# Patient Record
Sex: MALE | Race: WHITE | NOT HISPANIC OR LATINO | URBAN - METROPOLITAN AREA
[De-identification: names, ages, dates, MRNs, and addresses within clinical notes are randomized per-mention and may not be internally consistent; named-entity substitution may affect disease eponyms.]

---

## 2017-07-13 ENCOUNTER — EMERGENCY (EMERGENCY)
Facility: HOSPITAL | Age: 82
LOS: 1 days | Discharge: HOME | End: 2017-07-13
Attending: EMERGENCY MEDICINE | Admitting: EMERGENCY MEDICINE
Payer: MEDICARE

## 2017-07-13 VITALS
DIASTOLIC BLOOD PRESSURE: 95 MMHG | RESPIRATION RATE: 18 BRPM | OXYGEN SATURATION: 100 % | HEART RATE: 64 BPM | TEMPERATURE: 99 F | SYSTOLIC BLOOD PRESSURE: 118 MMHG

## 2017-07-13 VITALS
SYSTOLIC BLOOD PRESSURE: 102 MMHG | TEMPERATURE: 98 F | DIASTOLIC BLOOD PRESSURE: 54 MMHG | OXYGEN SATURATION: 100 % | RESPIRATION RATE: 18 BRPM | HEART RATE: 67 BPM

## 2017-07-13 LAB
ALBUMIN SERPL ELPH-MCNC: 3 G/DL — LOW (ref 3.3–5)
ALP SERPL-CCNC: 75 U/L — SIGNIFICANT CHANGE UP (ref 40–120)
ALT FLD-CCNC: 21 U/L — SIGNIFICANT CHANGE UP (ref 4–41)
ANISOCYTOSIS BLD QL: SLIGHT — SIGNIFICANT CHANGE UP
APPEARANCE UR: CLEAR — SIGNIFICANT CHANGE UP
APTT BLD: 27.7 SEC — SIGNIFICANT CHANGE UP (ref 27.5–37.4)
AST SERPL-CCNC: 20 U/L — SIGNIFICANT CHANGE UP (ref 4–40)
B PERT DNA SPEC QL NAA+PROBE: SIGNIFICANT CHANGE UP
BACTERIA # UR AUTO: SIGNIFICANT CHANGE UP
BASE EXCESS BLDV CALC-SCNC: 2.6 MMOL/L — SIGNIFICANT CHANGE UP
BASOPHILS # BLD AUTO: 0.03 K/UL — SIGNIFICANT CHANGE UP (ref 0–0.2)
BASOPHILS NFR BLD AUTO: 0.3 % — SIGNIFICANT CHANGE UP (ref 0–2)
BASOPHILS NFR SPEC: 0 % — SIGNIFICANT CHANGE UP (ref 0–2)
BILIRUB SERPL-MCNC: 0.5 MG/DL — SIGNIFICANT CHANGE UP (ref 0.2–1.2)
BILIRUB UR-MCNC: NEGATIVE — SIGNIFICANT CHANGE UP
BLOOD GAS VENOUS - CREATININE: 0.88 MG/DL — SIGNIFICANT CHANGE UP (ref 0.5–1.3)
BLOOD UR QL VISUAL: NEGATIVE — SIGNIFICANT CHANGE UP
BUN SERPL-MCNC: 21 MG/DL — SIGNIFICANT CHANGE UP (ref 7–23)
C PNEUM DNA SPEC QL NAA+PROBE: NOT DETECTED — SIGNIFICANT CHANGE UP
CALCIUM SERPL-MCNC: 8 MG/DL — LOW (ref 8.4–10.5)
CHLORIDE BLDV-SCNC: 106 MMOL/L — SIGNIFICANT CHANGE UP (ref 96–108)
CHLORIDE SERPL-SCNC: 104 MMOL/L — SIGNIFICANT CHANGE UP (ref 98–107)
CK MB BLD-MCNC: 1 NG/ML — SIGNIFICANT CHANGE UP (ref 1–6.6)
CK SERPL-CCNC: 41 U/L — SIGNIFICANT CHANGE UP (ref 30–200)
CO2 SERPL-SCNC: 24 MMOL/L — SIGNIFICANT CHANGE UP (ref 22–31)
COLOR SPEC: YELLOW — SIGNIFICANT CHANGE UP
CREAT SERPL-MCNC: 0.89 MG/DL — SIGNIFICANT CHANGE UP (ref 0.5–1.3)
EOSINOPHIL # BLD AUTO: 0.1 K/UL — SIGNIFICANT CHANGE UP (ref 0–0.5)
EOSINOPHIL NFR BLD AUTO: 1 % — SIGNIFICANT CHANGE UP (ref 0–6)
EOSINOPHIL NFR FLD: 2 % — SIGNIFICANT CHANGE UP (ref 0–6)
FLUAV H1 2009 PAND RNA SPEC QL NAA+PROBE: NOT DETECTED — SIGNIFICANT CHANGE UP
FLUAV H1 RNA SPEC QL NAA+PROBE: NOT DETECTED — SIGNIFICANT CHANGE UP
FLUAV H3 RNA SPEC QL NAA+PROBE: NOT DETECTED — SIGNIFICANT CHANGE UP
FLUAV SUBTYP SPEC NAA+PROBE: SIGNIFICANT CHANGE UP
FLUBV RNA SPEC QL NAA+PROBE: NOT DETECTED — SIGNIFICANT CHANGE UP
GAS PNL BLDV: 129 MMOL/L — LOW (ref 136–146)
GLUCOSE BLDV-MCNC: 118 — HIGH (ref 70–99)
GLUCOSE SERPL-MCNC: 119 MG/DL — HIGH (ref 70–99)
GLUCOSE UR-MCNC: NEGATIVE — SIGNIFICANT CHANGE UP
HADV DNA SPEC QL NAA+PROBE: NOT DETECTED — SIGNIFICANT CHANGE UP
HCO3 BLDV-SCNC: 26 MMOL/L — SIGNIFICANT CHANGE UP (ref 20–27)
HCOV 229E RNA SPEC QL NAA+PROBE: NOT DETECTED — SIGNIFICANT CHANGE UP
HCOV HKU1 RNA SPEC QL NAA+PROBE: NOT DETECTED — SIGNIFICANT CHANGE UP
HCOV NL63 RNA SPEC QL NAA+PROBE: NOT DETECTED — SIGNIFICANT CHANGE UP
HCOV OC43 RNA SPEC QL NAA+PROBE: NOT DETECTED — SIGNIFICANT CHANGE UP
HCT VFR BLD CALC: 33.4 % — LOW (ref 39–50)
HCT VFR BLDV CALC: 34.5 % — LOW (ref 39–51)
HGB BLD-MCNC: 10.7 G/DL — LOW (ref 13–17)
HGB BLDV-MCNC: 11.2 G/DL — LOW (ref 13–17)
HMPV RNA SPEC QL NAA+PROBE: NOT DETECTED — SIGNIFICANT CHANGE UP
HPIV1 RNA SPEC QL NAA+PROBE: NOT DETECTED — SIGNIFICANT CHANGE UP
HPIV2 RNA SPEC QL NAA+PROBE: NOT DETECTED — SIGNIFICANT CHANGE UP
HPIV3 RNA SPEC QL NAA+PROBE: NOT DETECTED — SIGNIFICANT CHANGE UP
HPIV4 RNA SPEC QL NAA+PROBE: NOT DETECTED — SIGNIFICANT CHANGE UP
HYPOCHROMIA BLD QL: SLIGHT — SIGNIFICANT CHANGE UP
IMM GRANULOCYTES # BLD AUTO: 0.05 # — SIGNIFICANT CHANGE UP
IMM GRANULOCYTES NFR BLD AUTO: 0.5 % — SIGNIFICANT CHANGE UP (ref 0–1.5)
INR BLD: 1.23 — HIGH (ref 0.88–1.17)
KETONES UR-MCNC: NEGATIVE — SIGNIFICANT CHANGE UP
LACTATE BLDV-MCNC: 1.4 MMOL/L — SIGNIFICANT CHANGE UP (ref 0.5–2)
LEUKOCYTE ESTERASE UR-ACNC: NEGATIVE — SIGNIFICANT CHANGE UP
LYMPHOCYTES # BLD AUTO: 1.34 K/UL — SIGNIFICANT CHANGE UP (ref 1–3.3)
LYMPHOCYTES # BLD AUTO: 13.5 % — SIGNIFICANT CHANGE UP (ref 13–44)
LYMPHOCYTES NFR SPEC AUTO: 14 % — SIGNIFICANT CHANGE UP (ref 13–44)
M PNEUMO DNA SPEC QL NAA+PROBE: NOT DETECTED — SIGNIFICANT CHANGE UP
MANUAL SMEAR VERIFICATION: SIGNIFICANT CHANGE UP
MCHC RBC-ENTMCNC: 29.8 PG — SIGNIFICANT CHANGE UP (ref 27–34)
MCHC RBC-ENTMCNC: 32 % — SIGNIFICANT CHANGE UP (ref 32–36)
MCV RBC AUTO: 93 FL — SIGNIFICANT CHANGE UP (ref 80–100)
MONOCYTES # BLD AUTO: 0.81 K/UL — SIGNIFICANT CHANGE UP (ref 0–0.9)
MONOCYTES NFR BLD AUTO: 8.2 % — SIGNIFICANT CHANGE UP (ref 2–14)
MONOCYTES NFR BLD: 6 % — SIGNIFICANT CHANGE UP (ref 2–9)
MUCOUS THREADS # UR AUTO: SIGNIFICANT CHANGE UP
MYELOCYTES NFR BLD: 1 % — HIGH (ref 0–0)
NEUTROPHIL AB SER-ACNC: 77 % — SIGNIFICANT CHANGE UP (ref 43–77)
NEUTROPHILS # BLD AUTO: 7.57 K/UL — HIGH (ref 1.8–7.4)
NEUTROPHILS NFR BLD AUTO: 76.5 % — SIGNIFICANT CHANGE UP (ref 43–77)
NITRITE UR-MCNC: NEGATIVE — SIGNIFICANT CHANGE UP
NRBC # FLD: 0 — SIGNIFICANT CHANGE UP
PCO2 BLDV: 48 MMHG — SIGNIFICANT CHANGE UP (ref 41–51)
PH BLDV: 7.37 PH — SIGNIFICANT CHANGE UP (ref 7.32–7.43)
PH UR: 6.5 — SIGNIFICANT CHANGE UP (ref 4.6–8)
PLATELET # BLD AUTO: 96 K/UL — LOW (ref 150–400)
PLATELET COUNT - ESTIMATE: SIGNIFICANT CHANGE UP
PMV BLD: 10.2 FL — SIGNIFICANT CHANGE UP (ref 7–13)
PO2 BLDV: 47 MMHG — HIGH (ref 35–40)
POTASSIUM BLDV-SCNC: 3.7 MMOL/L — SIGNIFICANT CHANGE UP (ref 3.4–4.5)
POTASSIUM SERPL-MCNC: 4.1 MMOL/L — SIGNIFICANT CHANGE UP (ref 3.5–5.3)
POTASSIUM SERPL-SCNC: 4.1 MMOL/L — SIGNIFICANT CHANGE UP (ref 3.5–5.3)
PROT SERPL-MCNC: 5.6 G/DL — LOW (ref 6–8.3)
PROT UR-MCNC: 30 — SIGNIFICANT CHANGE UP
PROTHROM AB SERPL-ACNC: 13.8 SEC — HIGH (ref 9.8–13.1)
RBC # BLD: 3.59 M/UL — LOW (ref 4.2–5.8)
RBC # FLD: 13.9 % — SIGNIFICANT CHANGE UP (ref 10.3–14.5)
RBC CASTS # UR COMP ASSIST: SIGNIFICANT CHANGE UP (ref 0–?)
RSV RNA SPEC QL NAA+PROBE: NOT DETECTED — SIGNIFICANT CHANGE UP
RV+EV RNA SPEC QL NAA+PROBE: NOT DETECTED — SIGNIFICANT CHANGE UP
SAO2 % BLDV: 81 % — SIGNIFICANT CHANGE UP (ref 60–85)
SODIUM SERPL-SCNC: 138 MMOL/L — SIGNIFICANT CHANGE UP (ref 135–145)
SP GR SPEC: 1.02 — SIGNIFICANT CHANGE UP (ref 1–1.03)
TROPONIN T SERPL-MCNC: < 0.06 NG/ML — SIGNIFICANT CHANGE UP (ref 0–0.06)
UROBILINOGEN FLD QL: 1 E.U. — SIGNIFICANT CHANGE UP (ref 0.1–0.2)
WBC # BLD: 9.9 K/UL — SIGNIFICANT CHANGE UP (ref 3.8–10.5)
WBC # FLD AUTO: 9.9 K/UL — SIGNIFICANT CHANGE UP (ref 3.8–10.5)
WBC UR QL: SIGNIFICANT CHANGE UP (ref 0–?)

## 2017-07-13 PROCEDURE — 71010: CPT | Mod: 26

## 2017-07-13 PROCEDURE — 93010 ELECTROCARDIOGRAM REPORT: CPT

## 2017-07-13 PROCEDURE — 70450 CT HEAD/BRAIN W/O DYE: CPT | Mod: 26

## 2017-07-13 PROCEDURE — 99285 EMERGENCY DEPT VISIT HI MDM: CPT | Mod: 25,GC

## 2017-07-13 NOTE — ED ADULT NURSE NOTE - CHIEF COMPLAINT QUOTE
pt BIBEMS with a c/o AMS , As per patients family , pt was found unresponsive to command sluggish and aphasic. 0930 he was unable to speak. Dr Camargo  called for a stroke evaluation, code not called. AS per pt Hypotensive in the field , pt given a half of bolus.   pmhx Dementia HTN

## 2017-07-13 NOTE — ED PROVIDER NOTE - OBJECTIVE STATEMENT
80 YOM PMH DM, HTN, HLD, dementia, p/w generalized weakness and altered mental status for 3 days. Pt visited a respite facility Monday and since then suffered from altered mental status above baseline today. Pt AOx0 today unable to provide additional history.  Per daughter and wife Pt was supposed to go to respite facility today and was less responsive then usual which prompted the family to bring him to the ED. 80 YOM PMH DM, HTN, HLD, dementia, p/w generalized weakness and altered mental status for 3 days. Pt visited a respite facility Monday and since then suffered from altered mental status above baseline today. Pt AOx0 today unable to provide additional history.  Per daughter and wife Pt was supposed to go to respite facility today and was less responsive then usual which prompted the family to bring him to the ED.    Attending/Harris: 81 yo M w/ remote h/o colon CA ~ 8 years ago s/p resection, dementia USOH unitl this morning fter eating breakfast and taking a shower, was found by daughter sitting in chair and was not arousable until EMS arrival. As per EMS ePCR pt was arousable to verbal stimuli and at baseline of A&Ox2. 80 YOM PMH DM, HTN, HLD, dementia, p/w generalized weakness and altered mental status for 3 days. Pt visited a respite facility Monday and since then suffered from altered mental status above baseline today. Pt AOx0 today unable to provide additional history.  Per daughter and wife Pt was supposed to go to respite facility today and was less responsive then usual which prompted the family to bring him to the ED.    Attending/Harris: 81 yo M w/ remote h/o colon CA ~ 8 years ago s/p resection, dementia USOH until this morning after eating breakfast and taking a shower, was found by daughter sitting in chair and was not arousable until EMS arrival. As per EMS ePCR pt was arousable to verbal stimuli and at baseline of A&Ox2.  As per his daughter, during the event, her father was breathing and able to squeeze her hand.

## 2017-07-13 NOTE — ED PROVIDER NOTE - MEDICAL DECISION MAKING DETAILS
80 YOM PMH DM, HTN, HLD, dementia, p/w generalized weakness and altered mental status for 3 days.  VSS WNL.  Exam reveals AMS above baseline.  Evaluate and treat for AMS.  Treat symptomatically.  Admit.

## 2017-07-13 NOTE — ED PROVIDER NOTE - PROGRESS NOTE DETAILS
JW Pt back to baseline.  CT and labs now unremarkable. Pt has remained at baseline while in the ED. Test results reviewed with family who requested d/c to home. PMD office is closed until next week.

## 2017-07-13 NOTE — ED PROVIDER NOTE - DISCUSSED CLINICAL AND RADIOLOGICAL FINDINGS WITH, MDM
Pt states she has nausea. Denies unconsciousness.  Hit on back of the head by wooden susan in school today patient

## 2017-07-13 NOTE — ED ADULT NURSE NOTE - OBJECTIVE STATEMENT
Received patient to room 13, Patient is here for AMS. Pt evaluated by MD. He is alert and oriented times three. IVL placed to left AC 18 gauge by EMS. Labs drawn and sent. Awwaiting results and disposition.     LAUREN Amaral

## 2017-07-13 NOTE — ED PROVIDER NOTE - PHYSICAL EXAMINATION
Attending/Harris: NAD; PERRL/EOMI, non-icterus, supple, no NATIVIDAD, no JVD, RRR, CTAB; Abd-soft, NT/ND, no HSM; Rectal Temp-98.4; -uncircumcise, no testicular swelling or PT; no LE edema, A&Ox1, nonfocal; Skin-warm/dry

## 2017-07-13 NOTE — ED ADULT TRIAGE NOTE - CHIEF COMPLAINT QUOTE
pt BIBEMS with a c/o AMS , As per patients family , pt was found unresponsive to command sluggish and aphasic. 0930 he was unable to speak. Dr Camargo  called for a stroke evaluation. AS per pt Hypotensive in the field , pt given a half of bolus.   pmhx Dementia HTN pt BIBEMS with a c/o AMS , As per patients family , pt was found unresponsive to command sluggish and aphasic. 0930 he was unable to speak. Dr Camargo  called for a stroke evaluation, code not called. AS per pt Hypotensive in the field , pt given a half of bolus.   pmhx Dementia HTN

## 2017-07-14 ENCOUNTER — INPATIENT (INPATIENT)
Facility: HOSPITAL | Age: 82
LOS: 5 days | Discharge: HOME | End: 2017-07-20
Attending: INTERNAL MEDICINE | Admitting: INTERNAL MEDICINE
Payer: MEDICARE

## 2017-07-14 VITALS
DIASTOLIC BLOOD PRESSURE: 60 MMHG | TEMPERATURE: 102 F | HEIGHT: 68 IN | SYSTOLIC BLOOD PRESSURE: 116 MMHG | WEIGHT: 134.48 LBS | OXYGEN SATURATION: 96 % | RESPIRATION RATE: 16 BRPM | HEART RATE: 78 BPM

## 2017-07-14 DIAGNOSIS — F10.10 ALCOHOL ABUSE, UNCOMPLICATED: ICD-10-CM

## 2017-07-14 DIAGNOSIS — G30.1 ALZHEIMER'S DISEASE WITH LATE ONSET: ICD-10-CM

## 2017-07-14 DIAGNOSIS — I26.99 OTHER PULMONARY EMBOLISM WITHOUT ACUTE COR PULMONALE: ICD-10-CM

## 2017-07-14 DIAGNOSIS — J15.6 PNEUMONIA DUE TO OTHER GRAM-NEGATIVE BACTERIA: ICD-10-CM

## 2017-07-14 DIAGNOSIS — E78.00 PURE HYPERCHOLESTEROLEMIA, UNSPECIFIED: ICD-10-CM

## 2017-07-14 DIAGNOSIS — C18.9 MALIGNANT NEOPLASM OF COLON, UNSPECIFIED: ICD-10-CM

## 2017-07-14 LAB
ALBUMIN SERPL ELPH-MCNC: 2.5 G/DL — LOW (ref 3.3–5)
ALP SERPL-CCNC: 88 U/L — SIGNIFICANT CHANGE UP (ref 40–120)
ALT FLD-CCNC: 31 U/L — SIGNIFICANT CHANGE UP (ref 12–78)
ANION GAP SERPL CALC-SCNC: 6 MMOL/L — SIGNIFICANT CHANGE UP (ref 5–17)
ANION GAP SERPL CALC-SCNC: 8 MMOL/L — SIGNIFICANT CHANGE UP (ref 5–17)
APPEARANCE UR: CLEAR — SIGNIFICANT CHANGE UP
APTT BLD: 29.2 SEC — SIGNIFICANT CHANGE UP (ref 27.5–37.4)
APTT BLD: 63.6 SEC — HIGH (ref 27.5–37.4)
AST SERPL-CCNC: 58 U/L — HIGH (ref 15–37)
BASE EXCESS BLDA CALC-SCNC: 1.2 MMOL/L — SIGNIFICANT CHANGE UP (ref -2–2)
BASOPHILS # BLD AUTO: 0.1 K/UL — SIGNIFICANT CHANGE UP (ref 0–0.2)
BASOPHILS NFR BLD AUTO: 0.5 % — SIGNIFICANT CHANGE UP (ref 0–2)
BILIRUB SERPL-MCNC: 1 MG/DL — SIGNIFICANT CHANGE UP (ref 0.2–1.2)
BILIRUB UR-MCNC: NEGATIVE — SIGNIFICANT CHANGE UP
BLOOD GAS COMMENTS: SIGNIFICANT CHANGE UP
BLOOD GAS SOURCE: SIGNIFICANT CHANGE UP
BUN SERPL-MCNC: 14 MG/DL — SIGNIFICANT CHANGE UP (ref 7–23)
BUN SERPL-MCNC: 19 MG/DL — SIGNIFICANT CHANGE UP (ref 7–23)
CALCIUM SERPL-MCNC: 8.1 MG/DL — LOW (ref 8.5–10.1)
CALCIUM SERPL-MCNC: 8.5 MG/DL — SIGNIFICANT CHANGE UP (ref 8.5–10.1)
CHLORIDE SERPL-SCNC: 103 MMOL/L — SIGNIFICANT CHANGE UP (ref 96–108)
CHLORIDE SERPL-SCNC: 107 MMOL/L — SIGNIFICANT CHANGE UP (ref 96–108)
CO2 SERPL-SCNC: 25 MMOL/L — SIGNIFICANT CHANGE UP (ref 22–31)
CO2 SERPL-SCNC: 27 MMOL/L — SIGNIFICANT CHANGE UP (ref 22–31)
COLOR SPEC: YELLOW — SIGNIFICANT CHANGE UP
CREAT SERPL-MCNC: 0.94 MG/DL — SIGNIFICANT CHANGE UP (ref 0.5–1.3)
CREAT SERPL-MCNC: 1.1 MG/DL — SIGNIFICANT CHANGE UP (ref 0.5–1.3)
DIFF PNL FLD: ABNORMAL
EOSINOPHIL # BLD AUTO: 0 K/UL — SIGNIFICANT CHANGE UP (ref 0–0.5)
EOSINOPHIL NFR BLD AUTO: 0 % — SIGNIFICANT CHANGE UP (ref 0–6)
EPI CELLS # UR: SIGNIFICANT CHANGE UP
GLUCOSE SERPL-MCNC: 129 MG/DL — HIGH (ref 70–99)
GLUCOSE SERPL-MCNC: 153 MG/DL — HIGH (ref 70–99)
GLUCOSE UR QL: NEGATIVE MG/DL — SIGNIFICANT CHANGE UP
HCO3 BLDA-SCNC: 26 MMOL/L — SIGNIFICANT CHANGE UP (ref 21–29)
HCT VFR BLD CALC: 33.4 % — LOW (ref 39–50)
HCT VFR BLD CALC: 34.6 % — LOW (ref 39–50)
HGB BLD-MCNC: 11.8 G/DL — LOW (ref 13–17)
HGB BLD-MCNC: 12 G/DL — LOW (ref 13–17)
HOROWITZ INDEX BLDA+IHG-RTO: 28 — SIGNIFICANT CHANGE UP
INR BLD: 1.23 RATIO — HIGH (ref 0.88–1.16)
KETONES UR-MCNC: ABNORMAL
LACTATE SERPL-SCNC: 1.1 MMOL/L — SIGNIFICANT CHANGE UP (ref 0.7–2)
LACTATE SERPL-SCNC: 2.8 MMOL/L — HIGH (ref 0.7–2)
LEUKOCYTE ESTERASE UR-ACNC: ABNORMAL
LYMPHOCYTES # BLD AUTO: 0.8 K/UL — LOW (ref 1–3.3)
LYMPHOCYTES # BLD AUTO: 7.1 % — LOW (ref 13–44)
MCHC RBC-ENTMCNC: 30.6 PG — SIGNIFICANT CHANGE UP (ref 27–34)
MCHC RBC-ENTMCNC: 31.8 PG — SIGNIFICANT CHANGE UP (ref 27–34)
MCHC RBC-ENTMCNC: 34.6 GM/DL — SIGNIFICANT CHANGE UP (ref 32–36)
MCHC RBC-ENTMCNC: 35.2 GM/DL — SIGNIFICANT CHANGE UP (ref 32–36)
MCV RBC AUTO: 88.6 FL — SIGNIFICANT CHANGE UP (ref 80–100)
MCV RBC AUTO: 90.3 FL — SIGNIFICANT CHANGE UP (ref 80–100)
MONOCYTES # BLD AUTO: 0.7 K/UL — SIGNIFICANT CHANGE UP (ref 0–0.9)
MONOCYTES NFR BLD AUTO: 6.2 % — SIGNIFICANT CHANGE UP (ref 2–14)
NEUTROPHILS # BLD AUTO: 9.5 K/UL — HIGH (ref 1.8–7.4)
NEUTROPHILS NFR BLD AUTO: 86.2 % — HIGH (ref 43–77)
NITRITE UR-MCNC: NEGATIVE — SIGNIFICANT CHANGE UP
PCO2 BLDA: 42 MMHG — SIGNIFICANT CHANGE UP (ref 32–46)
PH BLD: 7.4 — SIGNIFICANT CHANGE UP (ref 7.35–7.45)
PH UR: 6 — SIGNIFICANT CHANGE UP (ref 5–8)
PLATELET # BLD AUTO: 100 K/UL — LOW (ref 150–400)
PLATELET # BLD AUTO: 102 K/UL — LOW (ref 150–400)
PO2 BLDA: 86 MMHG — SIGNIFICANT CHANGE UP (ref 74–108)
POTASSIUM SERPL-MCNC: 3.7 MMOL/L — SIGNIFICANT CHANGE UP (ref 3.5–5.3)
POTASSIUM SERPL-MCNC: 5.5 MMOL/L — HIGH (ref 3.5–5.3)
POTASSIUM SERPL-SCNC: 3.7 MMOL/L — SIGNIFICANT CHANGE UP (ref 3.5–5.3)
POTASSIUM SERPL-SCNC: 5.5 MMOL/L — HIGH (ref 3.5–5.3)
PROT SERPL-MCNC: 6.7 GM/DL — SIGNIFICANT CHANGE UP (ref 6–8.3)
PROT UR-MCNC: 30 MG/DL
PROTHROM AB SERPL-ACNC: 13.5 SEC — HIGH (ref 9.8–12.7)
RBC # BLD: 3.7 M/UL — LOW (ref 4.2–5.8)
RBC # BLD: 3.9 M/UL — LOW (ref 4.2–5.8)
RBC # FLD: 12.9 % — SIGNIFICANT CHANGE UP (ref 11–15)
RBC # FLD: 13 % — SIGNIFICANT CHANGE UP (ref 11–15)
RBC CASTS # UR COMP ASSIST: ABNORMAL /HPF (ref 0–4)
SAO2 % BLDA: 96 % — SIGNIFICANT CHANGE UP (ref 92–96)
SODIUM SERPL-SCNC: 136 MMOL/L — SIGNIFICANT CHANGE UP (ref 135–145)
SODIUM SERPL-SCNC: 140 MMOL/L — SIGNIFICANT CHANGE UP (ref 135–145)
SP GR SPEC: 1.01 — SIGNIFICANT CHANGE UP (ref 1.01–1.02)
UROBILINOGEN FLD QL: 4 MG/DL
WBC # BLD: 10.1 K/UL — SIGNIFICANT CHANGE UP (ref 3.8–10.5)
WBC # BLD: 11 K/UL — HIGH (ref 3.8–10.5)
WBC # FLD AUTO: 10.1 K/UL — SIGNIFICANT CHANGE UP (ref 3.8–10.5)
WBC # FLD AUTO: 11 K/UL — HIGH (ref 3.8–10.5)
WBC UR QL: SIGNIFICANT CHANGE UP

## 2017-07-14 PROCEDURE — 93010 ELECTROCARDIOGRAM REPORT: CPT

## 2017-07-14 PROCEDURE — 71275 CT ANGIOGRAPHY CHEST: CPT | Mod: 26

## 2017-07-14 PROCEDURE — 99285 EMERGENCY DEPT VISIT HI MDM: CPT

## 2017-07-14 PROCEDURE — 70450 CT HEAD/BRAIN W/O DYE: CPT | Mod: 26

## 2017-07-14 RX ORDER — SERTRALINE 25 MG/1
100 TABLET, FILM COATED ORAL DAILY
Qty: 0 | Refills: 0 | Status: DISCONTINUED | OUTPATIENT
Start: 2017-07-14 | End: 2017-07-20

## 2017-07-14 RX ORDER — QUETIAPINE FUMARATE 200 MG/1
50 TABLET, FILM COATED ORAL AT BEDTIME
Qty: 0 | Refills: 0 | Status: DISCONTINUED | OUTPATIENT
Start: 2017-07-14 | End: 2017-07-20

## 2017-07-14 RX ORDER — PIPERACILLIN AND TAZOBACTAM 4; .5 G/20ML; G/20ML
3.38 INJECTION, POWDER, LYOPHILIZED, FOR SOLUTION INTRAVENOUS ONCE
Qty: 0 | Refills: 0 | Status: COMPLETED | OUTPATIENT
Start: 2017-07-14 | End: 2017-07-14

## 2017-07-14 RX ORDER — VANCOMYCIN HCL 1 G
750 VIAL (EA) INTRAVENOUS EVERY 12 HOURS
Qty: 0 | Refills: 0 | Status: DISCONTINUED | OUTPATIENT
Start: 2017-07-15 | End: 2017-07-19

## 2017-07-14 RX ORDER — HEPARIN SODIUM 5000 [USP'U]/ML
2500 INJECTION INTRAVENOUS; SUBCUTANEOUS EVERY 6 HOURS
Qty: 0 | Refills: 0 | Status: DISCONTINUED | OUTPATIENT
Start: 2017-07-14 | End: 2017-07-15

## 2017-07-14 RX ORDER — PIPERACILLIN AND TAZOBACTAM 4; .5 G/20ML; G/20ML
3.38 INJECTION, POWDER, LYOPHILIZED, FOR SOLUTION INTRAVENOUS EVERY 8 HOURS
Qty: 0 | Refills: 0 | Status: DISCONTINUED | OUTPATIENT
Start: 2017-07-14 | End: 2017-07-19

## 2017-07-14 RX ORDER — HEPARIN SODIUM 5000 [USP'U]/ML
5000 INJECTION INTRAVENOUS; SUBCUTANEOUS EVERY 6 HOURS
Qty: 0 | Refills: 0 | Status: DISCONTINUED | OUTPATIENT
Start: 2017-07-14 | End: 2017-07-15

## 2017-07-14 RX ORDER — MEMANTINE HYDROCHLORIDE 10 MG/1
1 TABLET ORAL
Qty: 0 | Refills: 0 | COMMUNITY

## 2017-07-14 RX ORDER — VANCOMYCIN HCL 1 G
1000 VIAL (EA) INTRAVENOUS ONCE
Qty: 0 | Refills: 0 | Status: COMPLETED | OUTPATIENT
Start: 2017-07-14 | End: 2017-07-14

## 2017-07-14 RX ORDER — SODIUM CHLORIDE 9 MG/ML
3 INJECTION INTRAMUSCULAR; INTRAVENOUS; SUBCUTANEOUS ONCE
Qty: 0 | Refills: 0 | Status: COMPLETED | OUTPATIENT
Start: 2017-07-14 | End: 2017-07-14

## 2017-07-14 RX ORDER — VANCOMYCIN HCL 1 G
VIAL (EA) INTRAVENOUS
Qty: 0 | Refills: 0 | Status: DISCONTINUED | OUTPATIENT
Start: 2017-07-14 | End: 2017-07-19

## 2017-07-14 RX ORDER — VANCOMYCIN HCL 1 G
750 VIAL (EA) INTRAVENOUS ONCE
Qty: 0 | Refills: 0 | Status: COMPLETED | OUTPATIENT
Start: 2017-07-14 | End: 2017-07-14

## 2017-07-14 RX ORDER — SIMVASTATIN 20 MG/1
1 TABLET, FILM COATED ORAL
Qty: 0 | Refills: 0 | COMMUNITY

## 2017-07-14 RX ORDER — HEPARIN SODIUM 5000 [USP'U]/ML
INJECTION INTRAVENOUS; SUBCUTANEOUS
Qty: 25000 | Refills: 0 | Status: DISCONTINUED | OUTPATIENT
Start: 2017-07-14 | End: 2017-07-15

## 2017-07-14 RX ORDER — SERTRALINE 25 MG/1
1 TABLET, FILM COATED ORAL
Qty: 0 | Refills: 0 | COMMUNITY

## 2017-07-14 RX ORDER — HEPARIN SODIUM 5000 [USP'U]/ML
5000 INJECTION INTRAVENOUS; SUBCUTANEOUS ONCE
Qty: 0 | Refills: 0 | Status: COMPLETED | OUTPATIENT
Start: 2017-07-14 | End: 2017-07-14

## 2017-07-14 RX ORDER — SIMVASTATIN 20 MG/1
10 TABLET, FILM COATED ORAL AT BEDTIME
Qty: 0 | Refills: 0 | Status: DISCONTINUED | OUTPATIENT
Start: 2017-07-14 | End: 2017-07-20

## 2017-07-14 RX ORDER — ACETAMINOPHEN 500 MG
650 TABLET ORAL ONCE
Qty: 0 | Refills: 0 | Status: COMPLETED | OUTPATIENT
Start: 2017-07-14 | End: 2017-07-14

## 2017-07-14 RX ORDER — MEMANTINE HYDROCHLORIDE 10 MG/1
10 TABLET ORAL DAILY
Qty: 0 | Refills: 0 | Status: DISCONTINUED | OUTPATIENT
Start: 2017-07-14 | End: 2017-07-20

## 2017-07-14 RX ORDER — SODIUM CHLORIDE 9 MG/ML
1000 INJECTION INTRAMUSCULAR; INTRAVENOUS; SUBCUTANEOUS
Qty: 0 | Refills: 0 | Status: DISCONTINUED | OUTPATIENT
Start: 2017-07-14 | End: 2017-07-20

## 2017-07-14 RX ORDER — SODIUM CHLORIDE 9 MG/ML
1850 INJECTION INTRAMUSCULAR; INTRAVENOUS; SUBCUTANEOUS ONCE
Qty: 0 | Refills: 0 | Status: COMPLETED | OUTPATIENT
Start: 2017-07-14 | End: 2017-07-14

## 2017-07-14 RX ADMIN — SERTRALINE 100 MILLIGRAM(S): 25 TABLET, FILM COATED ORAL at 21:18

## 2017-07-14 RX ADMIN — HEPARIN SODIUM 1100 UNIT(S)/HR: 5000 INJECTION INTRAVENOUS; SUBCUTANEOUS at 14:58

## 2017-07-14 RX ADMIN — PIPERACILLIN AND TAZOBACTAM 200 GRAM(S): 4; .5 INJECTION, POWDER, LYOPHILIZED, FOR SOLUTION INTRAVENOUS at 17:06

## 2017-07-14 RX ADMIN — SODIUM CHLORIDE 75 MILLILITER(S): 9 INJECTION INTRAMUSCULAR; INTRAVENOUS; SUBCUTANEOUS at 15:28

## 2017-07-14 RX ADMIN — MEMANTINE HYDROCHLORIDE 10 MILLIGRAM(S): 10 TABLET ORAL at 21:18

## 2017-07-14 RX ADMIN — PIPERACILLIN AND TAZOBACTAM 200 GRAM(S): 4; .5 INJECTION, POWDER, LYOPHILIZED, FOR SOLUTION INTRAVENOUS at 11:10

## 2017-07-14 RX ADMIN — SODIUM CHLORIDE 3 MILLILITER(S): 9 INJECTION INTRAMUSCULAR; INTRAVENOUS; SUBCUTANEOUS at 10:00

## 2017-07-14 RX ADMIN — Medication 150 MILLIGRAM(S): at 17:40

## 2017-07-14 RX ADMIN — Medication 250 MILLIGRAM(S): at 11:34

## 2017-07-14 RX ADMIN — HEPARIN SODIUM 5000 UNIT(S): 5000 INJECTION INTRAVENOUS; SUBCUTANEOUS at 15:00

## 2017-07-14 RX ADMIN — QUETIAPINE FUMARATE 50 MILLIGRAM(S): 200 TABLET, FILM COATED ORAL at 21:18

## 2017-07-14 RX ADMIN — Medication 650 MILLIGRAM(S): at 11:10

## 2017-07-14 RX ADMIN — SODIUM CHLORIDE 925 MILLILITER(S): 9 INJECTION INTRAMUSCULAR; INTRAVENOUS; SUBCUTANEOUS at 10:00

## 2017-07-14 NOTE — H&P ADULT - ASSESSMENT
83 year old man with dementia admitted with right mid lobar segmental Pulmonary embolism and bibasilar pneumonia.

## 2017-07-14 NOTE — H&P ADULT - NSHPPHYSICALEXAM_GEN_ALL_CORE
PHYSICAL EXAM:    GENERAL: NAD, well-groomed, well-developed, confused and disoriented.  HEAD:  Atraumatic, Normocephalic  EYES: EOMI, PERRLA, conjunctiva and sclera clear  ENMT: No tonsillar erythema, exudates, or enlargement; Moist mucous membranes, Good dentition, No lesions  NECK: Supple, No JVD, Normal thyroid  NERVOUS SYSTEM:  Alert & Oriented X3, Good concentration; Motor Strength 5/5 B/L upper and lower extremities; DTRs 2+ intact and symmetric  CHEST/LUNG: Clear to percussion bilaterally; No rales, rhonchi, wheezing, or rubs  HEART: Regular rate and rhythm; No murmurs, rubs, or gallops  ABDOMEN: Soft, Nontender, Nondistended; Bowel sounds present  EXTREMITIES:  2+ Peripheral Pulses, No clubbing, cyanosis, or edema  LYMPH: No lymphadenopathy noted  SKIN: No rashes or lesions

## 2017-07-14 NOTE — ED ADULT TRIAGE NOTE - CHIEF COMPLAINT QUOTE
Per [facility] staff pt arrived to day program with slurred speech sob, bloody sputum with unsteady gait. Per family pt had same symptoms yesterday and was treated at [facility]. Unknown time symptoms started today. Pt has hx of dementia. Staff at [facility] states pt is usually more alert.

## 2017-07-14 NOTE — H&P ADULT - HISTORY OF PRESENT ILLNESS
83 year old man with history of dementia presenting to the emergency from [facility] for evaluation of syncope. He was said to have suddenly become short of breath and then passed out.  In the emergency room he was found to have both pneumonia and pulmonary embolism. Patient was seen at the [facility] emergency room yesterday for similar symptoms.  Work up was negative.

## 2017-07-14 NOTE — H&P ADULT - NSHPLABSRESULTS_GEN_ALL_CORE
CBC Full  -  ( 14 Jul 2017 10:25 )  WBC Count : 11.0 K/uL  Hemoglobin : 12.0 g/dL  Hematocrit : 34.6 %  Platelet Count - Automated : 102 K/uL  Mean Cell Volume : 88.6 fl  Mean Cell Hemoglobin : 30.6 pg  Mean Cell Hemoglobin Concentration : 34.6 gm/dL  Auto Neutrophil # : 9.5 K/uL  Auto Lymphocyte # : 0.8 K/uL  Auto Monocyte # : 0.7 K/uL  Auto Eosinophil # : 0.0 K/uL  Auto Basophil # : 0.1 K/uL  Auto Neutrophil % : 86.2 %  Auto Lymphocyte % : 7.1 %  Auto Monocyte % : 6.2 %  Auto Eosinophil % : 0.0 %  Auto Basophil % : 0.5 %  14 Jul 2017 10:25    136    |  103    |  19     ----------------------------<  129    5.5     |  25     |  1.10     Ca    8.5        14 Jul 2017 10:25    TPro  6.7    /  Alb  2.5    /  TBili  1.0    /  DBili  x      /  AST  58     /  ALT  31     /  AlkPhos  88     14 Jul 2017 10:25  < from: CT Angio Chest w/ IV Cont (07.14.17 @ 12:44) >      CT ANGIO CHEST (W)AW IC                            PROCEDURE DATE:  07/14/2017            IMPRESSION:     Right middle lobe segmental pulmonary emboli.    Bibasilar pneumonia.    Other incidental findings as above.

## 2017-07-14 NOTE — ED ADULT NURSE NOTE - CHIEF COMPLAINT QUOTE
Per [facility] staff pt arrived to day program with slurred speech sob, bloody sputum with unsteady gait. Per family pt had same symptoms yesterday and was treated at [facility]. Unknown time symptoms started today. Pt has hx of dementia. Staff at [facility] states pt is usually more alert.

## 2017-07-14 NOTE — H&P ADULT - NSHPREVIEWOFSYSTEMS_GEN_ALL_CORE
REVIEW OF SYSTEMS:    CONSTITUTIONAL: No fever, weight loss, or fatigue  EYES: No eye pain, visual disturbances, or discharge  ENMT:  No difficulty hearing, tinnitus, vertigo; No sinus or throat pain  NECK: No pain or stiffness  BREASTS: No pain, masses, or nipple discharge  RESPIRATORY: No cough, wheezing, chills or hemoptysis; No shortness of breath  CARDIOVASCULAR: No chest pain, palpitations, dizziness, or leg swelling  GASTROINTESTINAL: No abdominal or epigastric pain. No nausea, vomiting, or hematemesis; No diarrhea or constipation. No melena or hematochezia.  GENITOURINARY: No dysuria, frequency, hematuria, or incontinence  NEUROLOGICAL: No headaches, memory loss, loss of strength, numbness, or tremors  SKIN: No itching, burning, rashes, or lesions   LYMPH NODES: No enlarged glands  ENDOCRINE: No heat or cold intolerance; No hair loss  MUSCULOSKELETAL: No joint pain or swelling; No muscle, back, or extremity pain  PSYCHIATRIC: No depression, anxiety, mood swings, or difficulty sleeping  HEME/LYMPH: No easy bruising, or bleeding gums  ALLERGY AND IMMUNOLOGIC: No hives or eczema

## 2017-07-14 NOTE — ED PROVIDER NOTE - OBJECTIVE STATEMENT
83 year old male with h/o hypercholesterolemia, alcohol abuse, colon cancer s/p colectomy and dementia presents today sent in from [facility] day program 83 year old male with h/o hypercholesterolemia, alcohol abuse, colon cancer s/p colectomy and dementia presents today sent in from [facility], when pt arrived it was reported that he appeared weak and confused, required assistance to get out of the vehicle, pt was also SOB, in triage pt's pulse ox was found to be low, pt is a poor historian

## 2017-07-14 NOTE — CONSULT NOTE ADULT - SUBJECTIVE AND OBJECTIVE BOX
Patient is a 83y old  Male who presents with a chief complaint of sob and not talking.    HPI: 83 year male with HLD, Obesity, Dementia and Colon Cancer treated only with  Colectomy few years ago. Was noticed with altered mental status yesterday, was taken to [facility] . Had work up for CVA which was negative so was released . Today again was noticed , not talking and complained about SOB. Wife reported  that was complaining of chest pain from time to time.  Family also noticed some cough for few days. No smoker.  CTA chest positive for PE.    PAST MEDICAL & SURGICAL HISTORY:  Alcohol abuse  Colon cancer: s/p colectomy  Dementia: mild  Hypercholesterolemia  History of cataract extraction: bilateral    FAMILY HISTORY:  No pertinent family history in first degree relatives    SOCIAL HISTORY: BMI (kg/m2): 27.3 . non smoker    Allergies  No Known Allergies    MEDICATIONS  (STANDING):  sodium chloride 0.9%. 1000 milliLiter(s) (75 mL/Hr) IV Continuous <Continuous>  heparin  Infusion.  Unit(s)/Hr (11 mL/Hr) IV Continuous <Continuous>  piperacillin/tazobactam IVPB. 3.375 Gram(s) IV Intermittent every 8 hours  vancomycin  IVPB   IV Intermittent     MEDICATIONS  (PRN):  heparin  Injectable 5000 Unit(s) IV Push every 6 hours PRN For aPTT less than 40  heparin  Injectable 2500 Unit(s) IV Push every 6 hours PRN For aPTT between 40 - 57    REVIEW OF SYSTEMS:  not  able to report.    Vital Signs Last 24 Hrs  T(C): 36.7 (2017 18:52), Max: 38.7 (2017 09:37)  T(F): 98.1 (2017 18:52), Max: 101.7 (2017 09:37)  HR: 60 (2017 18:52) (60 - 78)  BP: 122/64 (2017 18:52) (116/60 - 130/65)  BP(mean): --  RR: 15 (2017 18:52) (15 - 23)  SpO2: 98% (2017 18:52) (96% - 100%)    PHYSICAL EXAM:  GEN:        Awake, responsive and comfortable.  HEENT:    Normal.    RESP:      no wheezing.  CVS:         Regular rate and rhythm.   ABD:         Soft, non-tender, non-distended; obese.  :             No costovertebral angle tenderness  SKIN:           Warm and dry.  EXTR:            No clubbing, cyanosis or edema  CNS:              Intact sensory and motor function.  PSYCH:        cooperative, no anxiety or depression    LABS:                        12.0   11.0  )-----------( 102      ( 2017 10:25 )             34.6         136  |  103  |  19  ----------------------------<  129<H>  5.5<H>   |  25  |  1.10    Ca    8.5      2017 10:25    TPro  6.7  /  Alb  2.5<L>  /  TBili  1.0  /  DBili  x   /  AST  58<H>  /  ALT  31  /  AlkPhos  88      PT/INR - ( 2017 10:25 )   PT: 13.5 sec;   INR: 1.23 ratio         PTT - ( 2017 10:25 )  PTT:29.2 sec   @ 13:36  pH: 7.40  pCO2: 42  pO2: 86  SaO2: 96    Urinalysis Basic - ( 2017 11:17 )    Color: Yellow / Appearance: Clear / S.015 / pH: x  Gluc: x / Ketone: Trace  / Bili: Negative / Urobili: 4 mg/dL   Blood: x / Protein: 30 mg/dL / Nitrite: Negative   Leuk Esterase: Trace / RBC: 3-5 /HPF / WBC 3-5   Sq Epi: x / Non Sq Epi: Few / Bacteria: x    EKG: sinus rhythm    RADIOLOGY & ADDITIONAL STUDIES:  < from: CT Angio Chest w/ IV Cont (17 @ 12:44) >    EXAM:  CT ANGIO CHEST (W)AW IC                            PROCEDURE DATE:  2017          INTERPRETATION:    CT Angiogram of the chestwas performed with intravenous contrast.  100ml of Omnipaque 350 was injected intravenously. None was discarded.   Coronal, Saggital and 3D reformats were submitted.    Clinical information: bloody sputum, hypoxia, fever  r/o infiltrate    Comparison: None    FINDINGS:    VESSELS: Segmental pulmonary emboli on the right middle lobe. Evaluation   of bibasilarsegmental branches was limited by motion. The RV 2 LV ratio   less than 1. Pulmonary artery normal in caliber.    INCIDENTAL:    CHEST WALL AND LOWER NECK: Within normal limits  MEDIASTINUM AND MARA: Scattered calcified lymph nodes  HEART: Cardiomegaly, extensive coronary calcifications  LUNG AND LARGE AIRWAYS: Patchy bilateral lower lung predominant airspace   consolidations. No pleural effusion.    LIVER: Within normal limits  BILE DUCTS: Normal caliber  GALLBLADDER: No calcified gallstones. Normal caliber wall  PANCREAS: within normal limits  SPLEEN: within normal limits  ADRENALS: within normal limits  BONES: Within normal limits    IMPRESSION:     Right middle lobe segmental pulmonary emboli.    Bibasilar pneumonia.    Other incidental findings as above.    Findings were discussed with Physician: SHEYLA COLLINS [phone] with a   read back at 1:09 PM .    LEILANI MADDEN M.D., ATTENDING RADIOLOGIST  This docum    ASSESSMENT AND PLAN:  ·	Altered Mental status.  ·	Pulmonary embolism.  ·	Bibasilar pneumonia.  ·	Hyperkalemia.  ·	Dementia.  ·	HLD.  ·	Colon CA history.    Admitted to telemetry.  Full anticoagulation.  Antibiotic coverage,  Follow electrolytes.  Obtain procalcitonin.  Discussed with wife and daughter.

## 2017-07-14 NOTE — ED ADULT NURSE NOTE - OBJECTIVE STATEMENT
Pt was sent from [facility] for increased confusion and blood in the sputum. Pt appears confused but has hx of dementia. Pt has low oxygen saturation on room air. Pt denies any pain

## 2017-07-15 LAB
ALBUMIN SERPL ELPH-MCNC: 2.3 G/DL — LOW (ref 3.3–5)
ALP SERPL-CCNC: 78 U/L — SIGNIFICANT CHANGE UP (ref 40–120)
ALT FLD-CCNC: 22 U/L — SIGNIFICANT CHANGE UP (ref 12–78)
ANION GAP SERPL CALC-SCNC: 8 MMOL/L — SIGNIFICANT CHANGE UP (ref 5–17)
APTT BLD: 48.6 SEC — HIGH (ref 27.5–37.4)
APTT BLD: 50.8 SEC — HIGH (ref 27.5–37.4)
AST SERPL-CCNC: 20 U/L — SIGNIFICANT CHANGE UP (ref 15–37)
BACTERIA UR CULT: SIGNIFICANT CHANGE UP
BILIRUB SERPL-MCNC: 0.7 MG/DL — SIGNIFICANT CHANGE UP (ref 0.2–1.2)
BUN SERPL-MCNC: 12 MG/DL — SIGNIFICANT CHANGE UP (ref 7–23)
CALCIUM SERPL-MCNC: 8.1 MG/DL — LOW (ref 8.5–10.1)
CEA SERPL-MCNC: 1.3 NG/ML — SIGNIFICANT CHANGE UP (ref 0–3.8)
CHLORIDE SERPL-SCNC: 107 MMOL/L — SIGNIFICANT CHANGE UP (ref 96–108)
CO2 SERPL-SCNC: 28 MMOL/L — SIGNIFICANT CHANGE UP (ref 22–31)
CREAT SERPL-MCNC: 0.84 MG/DL — SIGNIFICANT CHANGE UP (ref 0.5–1.3)
CULTURE RESULTS: NO GROWTH — SIGNIFICANT CHANGE UP
GLUCOSE SERPL-MCNC: 81 MG/DL — SIGNIFICANT CHANGE UP (ref 70–99)
HCT VFR BLD CALC: 31.9 % — LOW (ref 39–50)
HCT VFR BLD CALC: 32.6 % — LOW (ref 39–50)
HGB BLD-MCNC: 11.1 G/DL — LOW (ref 13–17)
HGB BLD-MCNC: 11.6 G/DL — LOW (ref 13–17)
MAGNESIUM SERPL-MCNC: 2.1 MG/DL — SIGNIFICANT CHANGE UP (ref 1.6–2.6)
MCHC RBC-ENTMCNC: 31.6 PG — SIGNIFICANT CHANGE UP (ref 27–34)
MCHC RBC-ENTMCNC: 31.7 PG — SIGNIFICANT CHANGE UP (ref 27–34)
MCHC RBC-ENTMCNC: 34.9 GM/DL — SIGNIFICANT CHANGE UP (ref 32–36)
MCHC RBC-ENTMCNC: 35.6 GM/DL — SIGNIFICANT CHANGE UP (ref 32–36)
MCV RBC AUTO: 88.7 FL — SIGNIFICANT CHANGE UP (ref 80–100)
MCV RBC AUTO: 90.8 FL — SIGNIFICANT CHANGE UP (ref 80–100)
PLATELET # BLD AUTO: 110 K/UL — LOW (ref 150–400)
PLATELET # BLD AUTO: 131 K/UL — LOW (ref 150–400)
POTASSIUM SERPL-MCNC: 3.7 MMOL/L — SIGNIFICANT CHANGE UP (ref 3.5–5.3)
POTASSIUM SERPL-SCNC: 3.7 MMOL/L — SIGNIFICANT CHANGE UP (ref 3.5–5.3)
PROCALCITONIN SERPL-MCNC: 10 NG/ML — HIGH (ref 0–0.04)
PROT SERPL-MCNC: 5.7 GM/DL — LOW (ref 6–8.3)
RBC # BLD: 3.51 M/UL — LOW (ref 4.2–5.8)
RBC # BLD: 3.68 M/UL — LOW (ref 4.2–5.8)
RBC # FLD: 13.2 % — SIGNIFICANT CHANGE UP (ref 11–15)
RBC # FLD: 13.2 % — SIGNIFICANT CHANGE UP (ref 11–15)
SODIUM SERPL-SCNC: 143 MMOL/L — SIGNIFICANT CHANGE UP (ref 135–145)
SPECIMEN SOURCE: SIGNIFICANT CHANGE UP
SPECIMEN SOURCE: SIGNIFICANT CHANGE UP
WBC # BLD: 8.8 K/UL — SIGNIFICANT CHANGE UP (ref 3.8–10.5)
WBC # BLD: 9.4 K/UL — SIGNIFICANT CHANGE UP (ref 3.8–10.5)
WBC # FLD AUTO: 8.8 K/UL — SIGNIFICANT CHANGE UP (ref 3.8–10.5)
WBC # FLD AUTO: 9.4 K/UL — SIGNIFICANT CHANGE UP (ref 3.8–10.5)

## 2017-07-15 RX ORDER — HEPARIN SODIUM 5000 [USP'U]/ML
1300 INJECTION INTRAVENOUS; SUBCUTANEOUS
Qty: 25000 | Refills: 0 | Status: DISCONTINUED | OUTPATIENT
Start: 2017-07-15 | End: 2017-07-18

## 2017-07-15 RX ORDER — HEPARIN SODIUM 5000 [USP'U]/ML
6500 INJECTION INTRAVENOUS; SUBCUTANEOUS EVERY 6 HOURS
Qty: 0 | Refills: 0 | Status: DISCONTINUED | OUTPATIENT
Start: 2017-07-15 | End: 2017-07-18

## 2017-07-15 RX ORDER — HEPARIN SODIUM 5000 [USP'U]/ML
6500 INJECTION INTRAVENOUS; SUBCUTANEOUS ONCE
Qty: 0 | Refills: 0 | Status: COMPLETED | OUTPATIENT
Start: 2017-07-15 | End: 2017-07-15

## 2017-07-15 RX ORDER — HEPARIN SODIUM 5000 [USP'U]/ML
3000 INJECTION INTRAVENOUS; SUBCUTANEOUS EVERY 6 HOURS
Qty: 0 | Refills: 0 | Status: DISCONTINUED | OUTPATIENT
Start: 2017-07-15 | End: 2017-07-18

## 2017-07-15 RX ORDER — QUETIAPINE FUMARATE 200 MG/1
25 TABLET, FILM COATED ORAL ONCE
Qty: 0 | Refills: 0 | Status: COMPLETED | OUTPATIENT
Start: 2017-07-15 | End: 2017-07-15

## 2017-07-15 RX ADMIN — Medication 150 MILLIGRAM(S): at 17:17

## 2017-07-15 RX ADMIN — SIMVASTATIN 10 MILLIGRAM(S): 20 TABLET, FILM COATED ORAL at 00:49

## 2017-07-15 RX ADMIN — HEPARIN SODIUM 6500 UNIT(S): 5000 INJECTION INTRAVENOUS; SUBCUTANEOUS at 13:34

## 2017-07-15 RX ADMIN — PIPERACILLIN AND TAZOBACTAM 25 GRAM(S): 4; .5 INJECTION, POWDER, LYOPHILIZED, FOR SOLUTION INTRAVENOUS at 08:24

## 2017-07-15 RX ADMIN — HEPARIN SODIUM 1500 UNIT(S)/HR: 5000 INJECTION INTRAVENOUS; SUBCUTANEOUS at 20:10

## 2017-07-15 RX ADMIN — HEPARIN SODIUM 1100 UNIT(S)/HR: 5000 INJECTION INTRAVENOUS; SUBCUTANEOUS at 01:17

## 2017-07-15 RX ADMIN — SIMVASTATIN 10 MILLIGRAM(S): 20 TABLET, FILM COATED ORAL at 21:05

## 2017-07-15 RX ADMIN — PIPERACILLIN AND TAZOBACTAM 25 GRAM(S): 4; .5 INJECTION, POWDER, LYOPHILIZED, FOR SOLUTION INTRAVENOUS at 21:05

## 2017-07-15 RX ADMIN — PIPERACILLIN AND TAZOBACTAM 25 GRAM(S): 4; .5 INJECTION, POWDER, LYOPHILIZED, FOR SOLUTION INTRAVENOUS at 13:18

## 2017-07-15 RX ADMIN — PIPERACILLIN AND TAZOBACTAM 25 GRAM(S): 4; .5 INJECTION, POWDER, LYOPHILIZED, FOR SOLUTION INTRAVENOUS at 01:14

## 2017-07-15 RX ADMIN — SERTRALINE 100 MILLIGRAM(S): 25 TABLET, FILM COATED ORAL at 11:00

## 2017-07-15 RX ADMIN — QUETIAPINE FUMARATE 25 MILLIGRAM(S): 200 TABLET, FILM COATED ORAL at 19:11

## 2017-07-15 RX ADMIN — MEMANTINE HYDROCHLORIDE 10 MILLIGRAM(S): 10 TABLET ORAL at 11:00

## 2017-07-15 RX ADMIN — HEPARIN SODIUM 1300 UNIT(S)/HR: 5000 INJECTION INTRAVENOUS; SUBCUTANEOUS at 13:30

## 2017-07-15 RX ADMIN — SODIUM CHLORIDE 75 MILLILITER(S): 9 INJECTION INTRAMUSCULAR; INTRAVENOUS; SUBCUTANEOUS at 13:18

## 2017-07-15 RX ADMIN — QUETIAPINE FUMARATE 50 MILLIGRAM(S): 200 TABLET, FILM COATED ORAL at 21:05

## 2017-07-15 RX ADMIN — HEPARIN SODIUM 3000 UNIT(S): 5000 INJECTION INTRAVENOUS; SUBCUTANEOUS at 20:15

## 2017-07-15 RX ADMIN — Medication 150 MILLIGRAM(S): at 06:53

## 2017-07-16 DIAGNOSIS — I26.99 OTHER PULMONARY EMBOLISM WITHOUT ACUTE COR PULMONALE: ICD-10-CM

## 2017-07-16 LAB
ANION GAP SERPL CALC-SCNC: 7 MMOL/L — SIGNIFICANT CHANGE UP (ref 5–17)
APPEARANCE UR: CLEAR — SIGNIFICANT CHANGE UP
APTT BLD: 79.7 SEC — HIGH (ref 27.5–37.4)
APTT BLD: 84.8 SEC — HIGH (ref 27.5–37.4)
BACTERIA # UR AUTO: ABNORMAL
BILIRUB UR-MCNC: NEGATIVE — SIGNIFICANT CHANGE UP
BUN SERPL-MCNC: 10 MG/DL — SIGNIFICANT CHANGE UP (ref 7–23)
CALCIUM SERPL-MCNC: 8.1 MG/DL — LOW (ref 8.5–10.1)
CHLORIDE SERPL-SCNC: 106 MMOL/L — SIGNIFICANT CHANGE UP (ref 96–108)
CO2 SERPL-SCNC: 28 MMOL/L — SIGNIFICANT CHANGE UP (ref 22–31)
COLOR SPEC: YELLOW — SIGNIFICANT CHANGE UP
CREAT SERPL-MCNC: 0.88 MG/DL — SIGNIFICANT CHANGE UP (ref 0.5–1.3)
DIFF PNL FLD: NEGATIVE — SIGNIFICANT CHANGE UP
EPI CELLS # UR: SIGNIFICANT CHANGE UP
GLUCOSE SERPL-MCNC: 82 MG/DL — SIGNIFICANT CHANGE UP (ref 70–99)
GLUCOSE UR QL: NEGATIVE MG/DL — SIGNIFICANT CHANGE UP
HCT VFR BLD CALC: 33.3 % — LOW (ref 39–50)
HGB BLD-MCNC: 11.7 G/DL — LOW (ref 13–17)
KETONES UR-MCNC: NEGATIVE — SIGNIFICANT CHANGE UP
LEUKOCYTE ESTERASE UR-ACNC: NEGATIVE — SIGNIFICANT CHANGE UP
MAGNESIUM SERPL-MCNC: 1.9 MG/DL — SIGNIFICANT CHANGE UP (ref 1.6–2.6)
MCHC RBC-ENTMCNC: 31.9 PG — SIGNIFICANT CHANGE UP (ref 27–34)
MCHC RBC-ENTMCNC: 35.2 GM/DL — SIGNIFICANT CHANGE UP (ref 32–36)
MCV RBC AUTO: 90.7 FL — SIGNIFICANT CHANGE UP (ref 80–100)
NITRITE UR-MCNC: NEGATIVE — SIGNIFICANT CHANGE UP
PH UR: 6.5 — SIGNIFICANT CHANGE UP (ref 5–8)
PLATELET # BLD AUTO: 123 K/UL — LOW (ref 150–400)
POTASSIUM SERPL-MCNC: 3.6 MMOL/L — SIGNIFICANT CHANGE UP (ref 3.5–5.3)
POTASSIUM SERPL-SCNC: 3.6 MMOL/L — SIGNIFICANT CHANGE UP (ref 3.5–5.3)
PROT UR-MCNC: 15 MG/DL
RBC # BLD: 3.67 M/UL — LOW (ref 4.2–5.8)
RBC # FLD: 13.2 % — SIGNIFICANT CHANGE UP (ref 11–15)
RBC CASTS # UR COMP ASSIST: SIGNIFICANT CHANGE UP /HPF (ref 0–4)
SODIUM SERPL-SCNC: 141 MMOL/L — SIGNIFICANT CHANGE UP (ref 135–145)
SP GR SPEC: 1.01 — SIGNIFICANT CHANGE UP (ref 1.01–1.02)
UROBILINOGEN FLD QL: 4 MG/DL
VANCOMYCIN TROUGH SERPL-MCNC: 7.5 UG/ML — LOW (ref 10–20)
WBC # BLD: 9.3 K/UL — SIGNIFICANT CHANGE UP (ref 3.8–10.5)
WBC # FLD AUTO: 9.3 K/UL — SIGNIFICANT CHANGE UP (ref 3.8–10.5)

## 2017-07-16 RX ORDER — DIPHENHYDRAMINE HCL 50 MG
25 CAPSULE ORAL ONCE
Qty: 0 | Refills: 0 | Status: COMPLETED | OUTPATIENT
Start: 2017-07-16 | End: 2017-07-16

## 2017-07-16 RX ADMIN — Medication 25 MILLIGRAM(S): at 21:41

## 2017-07-16 RX ADMIN — SERTRALINE 100 MILLIGRAM(S): 25 TABLET, FILM COATED ORAL at 12:39

## 2017-07-16 RX ADMIN — MEMANTINE HYDROCHLORIDE 10 MILLIGRAM(S): 10 TABLET ORAL at 12:39

## 2017-07-16 RX ADMIN — QUETIAPINE FUMARATE 50 MILLIGRAM(S): 200 TABLET, FILM COATED ORAL at 21:40

## 2017-07-16 RX ADMIN — Medication 150 MILLIGRAM(S): at 05:47

## 2017-07-16 RX ADMIN — PIPERACILLIN AND TAZOBACTAM 25 GRAM(S): 4; .5 INJECTION, POWDER, LYOPHILIZED, FOR SOLUTION INTRAVENOUS at 13:18

## 2017-07-16 RX ADMIN — PIPERACILLIN AND TAZOBACTAM 25 GRAM(S): 4; .5 INJECTION, POWDER, LYOPHILIZED, FOR SOLUTION INTRAVENOUS at 21:41

## 2017-07-16 RX ADMIN — PIPERACILLIN AND TAZOBACTAM 25 GRAM(S): 4; .5 INJECTION, POWDER, LYOPHILIZED, FOR SOLUTION INTRAVENOUS at 05:47

## 2017-07-16 RX ADMIN — Medication 150 MILLIGRAM(S): at 16:59

## 2017-07-16 RX ADMIN — HEPARIN SODIUM 1500 UNIT(S)/HR: 5000 INJECTION INTRAVENOUS; SUBCUTANEOUS at 04:59

## 2017-07-16 RX ADMIN — HEPARIN SODIUM 1500 UNIT(S)/HR: 5000 INJECTION INTRAVENOUS; SUBCUTANEOUS at 11:31

## 2017-07-16 RX ADMIN — SIMVASTATIN 10 MILLIGRAM(S): 20 TABLET, FILM COATED ORAL at 21:41

## 2017-07-16 NOTE — CONSULT NOTE ADULT - ASSESSMENT
community acquired pneumonia ?? vs  fluid   acute delrium   check procalcitonin  on empiric antibiotics per pulm   likely po acceptable

## 2017-07-16 NOTE — CONSULT NOTE ADULT - SUBJECTIVE AND OBJECTIVE BOX
HPI:  83 year old man with history of dementia presenting to the emergency from [facility] for evaluation of syncope. He was said to have suddenly become short of breath and then passed out.  In the emergency room he was found to have both pneumonia and pulmonary embolism. Patient was seen at the [facility] emergency room yesterday for similar symptoms.  Work up was negative. (14 Jul 2017 20:04)      PAST MEDICAL & SURGICAL HISTORY:  Alcohol abuse  Colon cancer: s/p colectomy  Dementia: mild  Hypercholesterolemia  Colon cancer: s/p colectomy  History of cataract extraction: bilateral      SOCHX:   tobacco,  -  alcohol    FMHX: FA/MO  - contributory       Recent Travel:    Immunizations:    Allergies    No Known Allergies    Intolerances        MEDICATIONS  (STANDING):  sodium chloride 0.9%. 1000 milliLiter(s) (40 mL/Hr) IV Continuous <Continuous>  piperacillin/tazobactam IVPB. 3.375 Gram(s) IV Intermittent every 8 hours  vancomycin  IVPB   IV Intermittent   vancomycin  IVPB 750 milliGRAM(s) IV Intermittent every 12 hours  simvastatin 10 milliGRAM(s) Oral at bedtime  sertraline 100 milliGRAM(s) Oral daily  memantine 10 milliGRAM(s) Oral daily  QUEtiapine 50 milliGRAM(s) Oral at bedtime  heparin  Infusion. 1300 Unit(s)/Hr (13 mL/Hr) IV Continuous <Continuous>    MEDICATIONS  (PRN):  heparin  Injectable 6500 Unit(s) IV Push every 6 hours PRN For aPTT less than 40  heparin  Injectable 3000 Unit(s) IV Push every 6 hours PRN For aPTT between 40 - 57      REVIEW OF SYSTEMS:  CONSTITUTIONAL: No fever, weight loss, or fatigue  EYES: No eye pain, visual disturbances, or discharge  ENMT:  No difficulty hearing, tinnitus, vertigo; No sinus or throat pain  NECK: No pain or stiffness  BREASTS: No pain, masses, or nipple discharge  RESPIRATORY: No cough, wheezing, chills or hemoptysis; No shortness of breath  CARDIOVASCULAR: No chest pain, palpitations, dizziness, or leg swelling  GASTROINTESTINAL: No abdominal or epigastric pain. No nausea, vomiting, or hematemesis; No diarrhea or constipation. No melena or hematochezia.  GENITOURINARY: No dysuria, frequency, hematuria, or incontinence  NEUROLOGICAL: No headaches, memory loss, loss of strength, numbness, or tremors  SKIN: No itching, burning, rashes, or lesions   LYMPH NODES: No enlarged glands  ENDOCRINE: No heat or cold intolerance; No hair loss  MUSCULOSKELETAL: No joint pain or swelling; No muscle, back, or extremity pain  PSYCHIATRIC: No depression, anxiety, mood swings, or difficulty sleeping  HEME/LYMPH: No easy bruising, or bleeding gums  ALLERGY AND IMMUNOLOGIC: No hives or eczema    VITAL SIGNS:    T(C): 37.7 (07-16-17 @ 10:40), Max: 37.7 (07-16-17 @ 10:40)  T(F): 99.8 (07-16-17 @ 10:40), Max: 99.8 (07-16-17 @ 10:40)  HR: 57 (07-16-17 @ 10:40) (57 - 70)  BP: 146/71 (07-16-17 @ 10:40) (115/71 - 146/71)  RR: 16 (07-16-17 @ 10:40) (16 - 18)  SpO2: 90% (07-16-17 @ 10:40) (90% - 94%)    PHYSICAL EXAM:    GENERAL: NAD, well-groomed, well-developed  HEAD:  Atraumatic, Normocephalic  EYES: EOMI, PERRLA, conjunctiva and sclera clear  ENMT: No tonsillar erythema, exudates, or enlargement; Moist mucous membranes, Good dentition, No lesions  NECK: Supple, No JVD, Normal thyroid  NERVOUS SYSTEM:  Alert & Oriented X3, Good concentration; Motor Strength 5/5 B/L upper and lower extremities; DTRs 2+ intact and symmetric  CHEST/LUNG: Clear to percussion bilaterally; No rales, rhonchi, wheezing bilaterally  HEART: Regular rate and rhythm; No murmurs, rubs, or gallops  ABDOMEN: Soft, Nontender, Nondistended; Bowel sounds present  EXTREMITIES:  2+ Peripheral Pulses, No clubbing, cyanosis, or edema  LYMPH: No lymphadenopathy noted  SKIN: No rashes or lesions  BACK: no pressor sore     LABS:                         11.7   9.3   )-----------( 123      ( 16 Jul 2017 07:00 )             33.3     07-16    141  |  106  |  10  ----------------------------<  82  3.6   |  28  |  0.88    Ca    8.1<L>      16 Jul 2017 07:00  Mg     1.9     07-16    TPro  5.7<L>  /  Alb  2.3<L>  /  TBili  0.7  /  DBili  x   /  AST  20  /  ALT  22  /  AlkPhos  78  07-15    LIVER FUNCTIONS - ( 15 Jul 2017 07:43 )  Alb: 2.3 g/dL / Pro: 5.7 gm/dL / ALK PHOS: 78 U/L / ALT: 22 U/L / AST: 20 U/L / GGT: x           PTT - ( 16 Jul 2017 10:53 )  PTT:84.8 sec                  Vancomycin Level, Trough: 7.5 ug/mL (07-16 @ 07:00)                      Radiology: HPI:  83 year old man with history of dementia presenting to the emergency from [facility] for evaluation of syncope. He was said to have suddenly become short of breath and then passed out.  In the emergency room he was found to have both pneumonia and pulmonary embolism. Patient was seen at the [facility] emergency room yesterday for similar symptoms.  Work up was negative. (14 Jul 2017 20:04)      PAST MEDICAL & SURGICAL HISTORY:  Alcohol abuse  Colon cancer: s/p colectomy  Dementia: mild  Hypercholesterolemia  Colon cancer: s/p colectomy  History of cataract extraction: bilateral      SOCHX:  unable to obtain  tobacco, unable to obtain  -  alcohol  FMHX: FA/MO  non- contributory       Recent Travel:    Immunizations:    Allergies    No Known Allergies    Intolerances        MEDICATIONS  (STANDING):  sodium chloride 0.9%. 1000 milliLiter(s) (40 mL/Hr) IV Continuous <Continuous>  piperacillin/tazobactam IVPB. 3.375 Gram(s) IV Intermittent every 8 hours  vancomycin  IVPB   IV Intermittent   vancomycin  IVPB 750 milliGRAM(s) IV Intermittent every 12 hours  simvastatin 10 milliGRAM(s) Oral at bedtime  sertraline 100 milliGRAM(s) Oral daily  memantine 10 milliGRAM(s) Oral daily  QUEtiapine 50 milliGRAM(s) Oral at bedtime  heparin  Infusion. 1300 Unit(s)/Hr (13 mL/Hr) IV Continuous <Continuous>    MEDICATIONS  (PRN):  heparin  Injectable 6500 Unit(s) IV Push every 6 hours PRN For aPTT less than 40  heparin  Injectable 3000 Unit(s) IV Push every 6 hours PRN For aPTT between 40 - 57      REVIEW OF SYSTEMS:  unable to obtain  feels fine   VITAL SIGNS:    T(C): 37.7 (07-16-17 @ 10:40), Max: 37.7 (07-16-17 @ 10:40)  T(F): 99.8 (07-16-17 @ 10:40), Max: 99.8 (07-16-17 @ 10:40)  HR: 57 (07-16-17 @ 10:40) (57 - 70)  BP: 146/71 (07-16-17 @ 10:40) (115/71 - 146/71)  RR: 16 (07-16-17 @ 10:40) (16 - 18)  SpO2: 90% (07-16-17 @ 10:40) (90% - 94%)    PHYSICAL EXAM:    GENERAL: NAD, well-groomed, well-developed  HEAD:  Atraumatic, Normocephalic  EYES: EOMI, PERRLA, conjunctiva and sclera clear  ENMT: No tonsillar erythema, exudates, or enlargement; Moist mucous membrane  NECK: Supple, No JVD, Normal thyroid  NERVOUS SYSTEM:  Alert & confused  taking off oxygn   trying to climb oob   CHEST/LUNG: Clear  bilaterally; No rales, rhonchi, wheezing bilaterally  HEART: Regular rate and rhythm; No murmurs, rubs, or gallops  ABDOMEN: Soft, Nontender, Nondistended; Bowel sounds present  EXTREMITIES:  2+ Peripheral Pulses, No clubbing, cyanosis, or edema  LYMPH: No lymphadenopathy noted  SKIN: No rashes or lesions  BACK: no pressor sore     LABS:                         11.7   9.3   )-----------( 123      ( 16 Jul 2017 07:00 )             33.3     07-16    141  |  106  |  10  ----------------------------<  82  3.6   |  28  |  0.88    Ca    8.1<L>      16 Jul 2017 07:00  Mg     1.9     07-16    TPro  5.7<L>  /  Alb  2.3<L>  /  TBili  0.7  /  DBili  x   /  AST  20  /  ALT  22  /  AlkPhos  78  07-15    LIVER FUNCTIONS - ( 15 Jul 2017 07:43 )  Alb: 2.3 g/dL / Pro: 5.7 gm/dL / ALK PHOS: 78 U/L / ALT: 22 U/L / AST: 20 U/L / GGT: x           PTT - ( 16 Jul 2017 10:53 )  PTT:84.8 sec                  Vancomycin Level, Trough: 7.5 ug/mL (07-16 @ 07:00)                      Radiology:      < from: Xray Chest 1 View AP/PA (07.13.17 @ 12:12) >  MPRESSION:  Lungs underinflated.    Ill-defined increased markings and patchy opacification in both lung   bases compatible with subsegmental atelectasis and/or   infiltrate/pneumonia in the proper clinical context, follow-up suggested   as warranted.    Clear remaining visualized lungs. No pleural effusions or pneumothorax.    Heart size and mediastinal width inaccurately assessed on this   projection. Aortic calcifications again noted.    Slight rightward lower tracheal deviation due to pressure effect from the   adjacent aortic arch. Midline upper trachea.    Generalized osteopenia and spinal degenerative changes again noted.                  CARLA LABOY M.D., ATTENDING RADIOLOGIST  This document has been electronically signed. Jul 13 2017  3:12PM                  < end of copied text >

## 2017-07-17 LAB
APTT BLD: 74.4 SEC — HIGH (ref 27.5–37.4)
HCT VFR BLD CALC: 33.3 % — LOW (ref 39–50)
HGB BLD-MCNC: 11.7 G/DL — LOW (ref 13–17)
MCHC RBC-ENTMCNC: 31.8 PG — SIGNIFICANT CHANGE UP (ref 27–34)
MCHC RBC-ENTMCNC: 35.1 GM/DL — SIGNIFICANT CHANGE UP (ref 32–36)
MCV RBC AUTO: 90.7 FL — SIGNIFICANT CHANGE UP (ref 80–100)
PLATELET # BLD AUTO: 148 K/UL — LOW (ref 150–400)
RBC # BLD: 3.68 M/UL — LOW (ref 4.2–5.8)
RBC # FLD: 12.9 % — SIGNIFICANT CHANGE UP (ref 11–15)
WBC # BLD: 10.7 K/UL — HIGH (ref 3.8–10.5)
WBC # FLD AUTO: 10.7 K/UL — HIGH (ref 3.8–10.5)

## 2017-07-17 RX ORDER — DIPHENHYDRAMINE HCL 50 MG
25 CAPSULE ORAL ONCE
Qty: 0 | Refills: 0 | Status: COMPLETED | OUTPATIENT
Start: 2017-07-17 | End: 2017-07-17

## 2017-07-17 RX ORDER — ACETAMINOPHEN 500 MG
650 TABLET ORAL EVERY 6 HOURS
Qty: 0 | Refills: 0 | Status: DISCONTINUED | OUTPATIENT
Start: 2017-07-17 | End: 2017-07-20

## 2017-07-17 RX ADMIN — SIMVASTATIN 10 MILLIGRAM(S): 20 TABLET, FILM COATED ORAL at 21:33

## 2017-07-17 RX ADMIN — Medication 25 MILLIGRAM(S): at 23:39

## 2017-07-17 RX ADMIN — MEMANTINE HYDROCHLORIDE 10 MILLIGRAM(S): 10 TABLET ORAL at 13:55

## 2017-07-17 RX ADMIN — Medication 650 MILLIGRAM(S): at 20:43

## 2017-07-17 RX ADMIN — Medication 150 MILLIGRAM(S): at 05:29

## 2017-07-17 RX ADMIN — PIPERACILLIN AND TAZOBACTAM 25 GRAM(S): 4; .5 INJECTION, POWDER, LYOPHILIZED, FOR SOLUTION INTRAVENOUS at 21:33

## 2017-07-17 RX ADMIN — HEPARIN SODIUM 1500 UNIT(S)/HR: 5000 INJECTION INTRAVENOUS; SUBCUTANEOUS at 09:38

## 2017-07-17 RX ADMIN — QUETIAPINE FUMARATE 50 MILLIGRAM(S): 200 TABLET, FILM COATED ORAL at 21:33

## 2017-07-17 RX ADMIN — PIPERACILLIN AND TAZOBACTAM 25 GRAM(S): 4; .5 INJECTION, POWDER, LYOPHILIZED, FOR SOLUTION INTRAVENOUS at 16:13

## 2017-07-17 RX ADMIN — SERTRALINE 100 MILLIGRAM(S): 25 TABLET, FILM COATED ORAL at 13:55

## 2017-07-17 RX ADMIN — PIPERACILLIN AND TAZOBACTAM 25 GRAM(S): 4; .5 INJECTION, POWDER, LYOPHILIZED, FOR SOLUTION INTRAVENOUS at 05:29

## 2017-07-17 NOTE — PHYSICAL THERAPY INITIAL EVALUATION ADULT - ADDITIONAL COMMENTS
Pt. stated that he was living in a private house prior to [facility] and walks with a rolling walker (information has to be confirmed and clarified)

## 2017-07-17 NOTE — PHYSICAL THERAPY INITIAL EVALUATION ADULT - IMPAIRMENTS FOUND, PT EVAL
ergonomics and body mechanics/aerobic capacity/endurance/gait, locomotion, and balance/muscle strength

## 2017-07-17 NOTE — PHYSICAL THERAPY INITIAL EVALUATION ADULT - CRITERIA FOR SKILLED THERAPEUTIC INTERVENTIONS
anticipated discharge recommendation/risk reduction/prevention/functional limitations in following categories/impairments found

## 2017-07-18 DIAGNOSIS — R41.0 DISORIENTATION, UNSPECIFIED: ICD-10-CM

## 2017-07-18 LAB
APTT BLD: 83.3 SEC — HIGH (ref 27.5–37.4)
HCT VFR BLD CALC: 35.5 % — LOW (ref 39–50)
HGB BLD-MCNC: 12.5 G/DL — LOW (ref 13–17)
LEGIONELLA AG UR QL: NEGATIVE — SIGNIFICANT CHANGE UP
MCHC RBC-ENTMCNC: 32.1 PG — SIGNIFICANT CHANGE UP (ref 27–34)
MCHC RBC-ENTMCNC: 35.2 GM/DL — SIGNIFICANT CHANGE UP (ref 32–36)
MCV RBC AUTO: 91 FL — SIGNIFICANT CHANGE UP (ref 80–100)
PLATELET # BLD AUTO: 158 K/UL — SIGNIFICANT CHANGE UP (ref 150–400)
PROCALCITONIN SERPL-MCNC: 1.3 NG/ML — SIGNIFICANT CHANGE UP (ref 0–0.04)
RBC # BLD: 3.9 M/UL — LOW (ref 4.2–5.8)
RBC # FLD: 13.4 % — SIGNIFICANT CHANGE UP (ref 11–15)
WBC # BLD: 9.7 K/UL — SIGNIFICANT CHANGE UP (ref 3.8–10.5)
WBC # FLD AUTO: 9.7 K/UL — SIGNIFICANT CHANGE UP (ref 3.8–10.5)

## 2017-07-18 RX ORDER — RIVAROXABAN 15 MG-20MG
20 KIT ORAL EVERY 24 HOURS
Qty: 0 | Refills: 0 | Status: DISCONTINUED | OUTPATIENT
Start: 2017-07-18 | End: 2017-07-18

## 2017-07-18 RX ORDER — RIVAROXABAN 15 MG-20MG
15 KIT ORAL
Qty: 0 | Refills: 0 | Status: DISCONTINUED | OUTPATIENT
Start: 2017-07-18 | End: 2017-07-18

## 2017-07-18 RX ORDER — HEPARIN SODIUM 5000 [USP'U]/ML
1300 INJECTION INTRAVENOUS; SUBCUTANEOUS
Qty: 25000 | Refills: 0 | Status: DISCONTINUED | OUTPATIENT
Start: 2017-07-18 | End: 2017-07-18

## 2017-07-18 RX ORDER — BACITRACIN ZINC 500 UNIT/G
1 OINTMENT IN PACKET (EA) TOPICAL
Qty: 0 | Refills: 0 | Status: DISCONTINUED | OUTPATIENT
Start: 2017-07-18 | End: 2017-07-20

## 2017-07-18 RX ORDER — RIVAROXABAN 15 MG-20MG
15 KIT ORAL
Qty: 0 | Refills: 0 | Status: DISCONTINUED | OUTPATIENT
Start: 2017-07-18 | End: 2017-07-20

## 2017-07-18 RX ADMIN — HEPARIN SODIUM 1500 UNIT(S)/HR: 5000 INJECTION INTRAVENOUS; SUBCUTANEOUS at 09:00

## 2017-07-18 RX ADMIN — QUETIAPINE FUMARATE 50 MILLIGRAM(S): 200 TABLET, FILM COATED ORAL at 22:13

## 2017-07-18 RX ADMIN — PIPERACILLIN AND TAZOBACTAM 25 GRAM(S): 4; .5 INJECTION, POWDER, LYOPHILIZED, FOR SOLUTION INTRAVENOUS at 22:14

## 2017-07-18 RX ADMIN — Medication 1 APPLICATION(S): at 22:13

## 2017-07-18 RX ADMIN — MEMANTINE HYDROCHLORIDE 10 MILLIGRAM(S): 10 TABLET ORAL at 11:00

## 2017-07-18 RX ADMIN — SIMVASTATIN 10 MILLIGRAM(S): 20 TABLET, FILM COATED ORAL at 22:13

## 2017-07-18 RX ADMIN — PIPERACILLIN AND TAZOBACTAM 25 GRAM(S): 4; .5 INJECTION, POWDER, LYOPHILIZED, FOR SOLUTION INTRAVENOUS at 13:06

## 2017-07-18 RX ADMIN — SODIUM CHLORIDE 40 MILLILITER(S): 9 INJECTION INTRAMUSCULAR; INTRAVENOUS; SUBCUTANEOUS at 17:13

## 2017-07-18 RX ADMIN — PIPERACILLIN AND TAZOBACTAM 25 GRAM(S): 4; .5 INJECTION, POWDER, LYOPHILIZED, FOR SOLUTION INTRAVENOUS at 05:22

## 2017-07-18 RX ADMIN — RIVAROXABAN 15 MILLIGRAM(S): KIT at 14:05

## 2017-07-18 RX ADMIN — Medication 150 MILLIGRAM(S): at 05:16

## 2017-07-18 RX ADMIN — SERTRALINE 100 MILLIGRAM(S): 25 TABLET, FILM COATED ORAL at 11:00

## 2017-07-18 RX ADMIN — Medication 150 MILLIGRAM(S): at 17:14

## 2017-07-18 NOTE — PROGRESS NOTE ADULT - PROBLEM SELECTOR PROBLEM 1
Other pulmonary embolism without acute cor pulmonale, unspecified chronicity

## 2017-07-18 NOTE — PHARMACY COMMUNICATION NOTE - COMMENTS
Spoke to Pa and says Dr. best wanted to have the patient on heparin and xarelto and then D/C the heparin drip after 6 hours they are both on xarelto and heparin. I informed her that according to the xarelto information packet you start the xarelto once heparin infusion is discontinued.  The PA will discuss with Dr. Best and Rafael and for now will give the Xarelto and D/c the heparin.

## 2017-07-18 NOTE — PROGRESS NOTE ADULT - PROBLEM SELECTOR PLAN 2
Continue intravenous antibiotics

## 2017-07-18 NOTE — PROGRESS NOTE ADULT - PROBLEM SELECTOR PROBLEM 6
Malignant neoplasm of colon, unspecified part of colon

## 2017-07-18 NOTE — PROGRESS NOTE ADULT - PROBLEM SELECTOR PLAN 1
associated with syncopal episode.  Agree with intravenous heparin.

## 2017-07-18 NOTE — PROGRESS NOTE ADULT - PROBLEM SELECTOR PROBLEM 2
Pneumonia of right lung due to other aerobic Gram-negative bacteria, unspecified part of lung

## 2017-07-18 NOTE — PROGRESS NOTE ADULT - PROBLEM SELECTOR PROBLEM 5
Late onset Alzheimer's disease with behavioral disturbance

## 2017-07-19 ENCOUNTER — TRANSCRIPTION ENCOUNTER (OUTPATIENT)
Age: 82
End: 2017-07-19

## 2017-07-19 LAB
ANION GAP SERPL CALC-SCNC: 8 MMOL/L — SIGNIFICANT CHANGE UP (ref 5–17)
APTT BLD: 30.7 SEC — SIGNIFICANT CHANGE UP (ref 27.5–37.4)
BUN SERPL-MCNC: 8 MG/DL — SIGNIFICANT CHANGE UP (ref 7–23)
CALCIUM SERPL-MCNC: 8.6 MG/DL — SIGNIFICANT CHANGE UP (ref 8.5–10.1)
CHLORIDE SERPL-SCNC: 105 MMOL/L — SIGNIFICANT CHANGE UP (ref 96–108)
CO2 SERPL-SCNC: 28 MMOL/L — SIGNIFICANT CHANGE UP (ref 22–31)
CREAT SERPL-MCNC: 0.84 MG/DL — SIGNIFICANT CHANGE UP (ref 0.5–1.3)
CULTURE RESULTS: SIGNIFICANT CHANGE UP
CULTURE RESULTS: SIGNIFICANT CHANGE UP
GLUCOSE SERPL-MCNC: 83 MG/DL — SIGNIFICANT CHANGE UP (ref 70–99)
HCT VFR BLD CALC: 35 % — LOW (ref 39–50)
HGB BLD-MCNC: 12.5 G/DL — LOW (ref 13–17)
INR BLD: 1.32 RATIO — HIGH (ref 0.88–1.16)
LEGIONELLA AG UR QL: NEGATIVE — SIGNIFICANT CHANGE UP
M PNEUMO IGM SER-ACNC: 52 UNITS/ML — SIGNIFICANT CHANGE UP
MCHC RBC-ENTMCNC: 31.9 PG — SIGNIFICANT CHANGE UP (ref 27–34)
MCHC RBC-ENTMCNC: 35.6 GM/DL — SIGNIFICANT CHANGE UP (ref 32–36)
MCV RBC AUTO: 89.6 FL — SIGNIFICANT CHANGE UP (ref 80–100)
MYCOPLASMA AG SPEC QL: NEGATIVE — SIGNIFICANT CHANGE UP
PLATELET # BLD AUTO: 182 K/UL — SIGNIFICANT CHANGE UP (ref 150–400)
POTASSIUM SERPL-MCNC: 3.9 MMOL/L — SIGNIFICANT CHANGE UP (ref 3.5–5.3)
POTASSIUM SERPL-SCNC: 3.9 MMOL/L — SIGNIFICANT CHANGE UP (ref 3.5–5.3)
PROTHROM AB SERPL-ACNC: 14.5 SEC — HIGH (ref 9.8–12.7)
RBC # BLD: 3.91 M/UL — LOW (ref 4.2–5.8)
RBC # FLD: 12.8 % — SIGNIFICANT CHANGE UP (ref 11–15)
SODIUM SERPL-SCNC: 141 MMOL/L — SIGNIFICANT CHANGE UP (ref 135–145)
SPECIMEN SOURCE: SIGNIFICANT CHANGE UP
SPECIMEN SOURCE: SIGNIFICANT CHANGE UP
WBC # BLD: 7.9 K/UL — SIGNIFICANT CHANGE UP (ref 3.8–10.5)
WBC # FLD AUTO: 7.9 K/UL — SIGNIFICANT CHANGE UP (ref 3.8–10.5)

## 2017-07-19 PROCEDURE — 71010: CPT | Mod: 26

## 2017-07-19 RX ORDER — BACITRACIN ZINC 500 UNIT/G
1 OINTMENT IN PACKET (EA) TOPICAL
Qty: 0 | Refills: 0 | COMMUNITY
Start: 2017-07-19

## 2017-07-19 RX ORDER — RIVAROXABAN 15 MG-20MG
1 KIT ORAL
Qty: 0 | Refills: 0 | COMMUNITY
Start: 2017-07-19

## 2017-07-19 RX ORDER — DIPHENHYDRAMINE HCL 50 MG
25 CAPSULE ORAL ONCE
Qty: 0 | Refills: 0 | Status: COMPLETED | OUTPATIENT
Start: 2017-07-19 | End: 2017-07-19

## 2017-07-19 RX ADMIN — QUETIAPINE FUMARATE 50 MILLIGRAM(S): 200 TABLET, FILM COATED ORAL at 22:03

## 2017-07-19 RX ADMIN — SODIUM CHLORIDE 40 MILLILITER(S): 9 INJECTION INTRAMUSCULAR; INTRAVENOUS; SUBCUTANEOUS at 17:26

## 2017-07-19 RX ADMIN — Medication 150 MILLIGRAM(S): at 17:26

## 2017-07-19 RX ADMIN — RIVAROXABAN 15 MILLIGRAM(S): KIT at 05:26

## 2017-07-19 RX ADMIN — SERTRALINE 100 MILLIGRAM(S): 25 TABLET, FILM COATED ORAL at 11:32

## 2017-07-19 RX ADMIN — Medication 150 MILLIGRAM(S): at 05:26

## 2017-07-19 RX ADMIN — Medication 1 APPLICATION(S): at 17:25

## 2017-07-19 RX ADMIN — PIPERACILLIN AND TAZOBACTAM 25 GRAM(S): 4; .5 INJECTION, POWDER, LYOPHILIZED, FOR SOLUTION INTRAVENOUS at 15:15

## 2017-07-19 RX ADMIN — SIMVASTATIN 10 MILLIGRAM(S): 20 TABLET, FILM COATED ORAL at 22:03

## 2017-07-19 RX ADMIN — SODIUM CHLORIDE 40 MILLILITER(S): 9 INJECTION INTRAMUSCULAR; INTRAVENOUS; SUBCUTANEOUS at 05:27

## 2017-07-19 RX ADMIN — RIVAROXABAN 15 MILLIGRAM(S): KIT at 17:25

## 2017-07-19 RX ADMIN — PIPERACILLIN AND TAZOBACTAM 25 GRAM(S): 4; .5 INJECTION, POWDER, LYOPHILIZED, FOR SOLUTION INTRAVENOUS at 05:26

## 2017-07-19 RX ADMIN — Medication 1 APPLICATION(S): at 05:26

## 2017-07-19 RX ADMIN — Medication 25 MILLIGRAM(S): at 01:26

## 2017-07-19 RX ADMIN — MEMANTINE HYDROCHLORIDE 10 MILLIGRAM(S): 10 TABLET ORAL at 11:33

## 2017-07-19 NOTE — DISCHARGE NOTE ADULT - CARE PROVIDER_API CALL
Brina Hall), Internal Medicine  [address] [place] [address]  Phone: [phone]  Fax: [phone]

## 2017-07-19 NOTE — DISCHARGE NOTE ADULT - PATIENT PORTAL LINK FT
“You can access the Follow[facility] Patient Portal, offered by [facility], by registering with the following website: [email or web address]”

## 2017-07-19 NOTE — DISCHARGE NOTE ADULT - SECONDARY DIAGNOSIS.
Pneumonia of right lung due to other aerobic Gram-negative bacteria, unspecified part of lung Late onset Alzheimer's disease with behavioral disturbance Malignant neoplasm of colon, unspecified part of colon Hypercholesterolemia History of cataract extraction, unspecified laterality Delirium

## 2017-07-19 NOTE — DISCHARGE NOTE ADULT - CARE PLAN
Principal Discharge DX:	Other pulmonary embolism without acute cor pulmonale, unspecified chronicity  Goal:	resolution of problem  Instructions for follow-up, activity and diet:	2 g na diet  Secondary Diagnosis:	Pneumonia of right lung due to other aerobic Gram-negative bacteria, unspecified part of lung  Secondary Diagnosis:	Late onset Alzheimer's disease with behavioral disturbance  Secondary Diagnosis:	Malignant neoplasm of colon, unspecified part of colon  Secondary Diagnosis:	Hypercholesterolemia  Secondary Diagnosis:	History of cataract extraction, unspecified laterality  Secondary Diagnosis:	Delirium

## 2017-07-19 NOTE — DISCHARGE NOTE ADULT - MEDICATION SUMMARY - MEDICATIONS TO TAKE
I will START or STAY ON the medications listed below when I get home from the hospital:    rivaroxaban 15 mg oral tablet  -- 1 tab(s) by mouth 2 times a day  -- Indication: For Pe    sertraline 100 mg oral tablet  -- 1 tab(s) by mouth once a day  -- Indication: For Depression    simvastatin 10 mg oral tablet  -- 1 tab(s) by mouth once a day (at bedtime)  -- Indication: For Hld    bacitracin 500 units/g topical ointment  -- 1 application on skin 2 times a day  -- Indication: For Antibiotic    Namenda XR 21 mg oral capsule, extended release  -- 1 cap(s) by mouth once a day  -- Indication: For Late onset Alzheimer's disease with behavioral disturbance

## 2017-07-19 NOTE — DISCHARGE NOTE ADULT - HOSPITAL COURSE
83 year old man with history of dementia presenting to the emergency from [facility], [place] for evaluation of syncope. He was said to have suddenly become short of breath and then passed out.  In the emergency room he was found to have both pneumonia and pulmonary embolism. Patient was seen at the [facility] emergency room yesterday for similar symptoms.  Work up was negative.  Patient found to have both Pulmonary embolism and pneumonia.  He was agitated.  He was treated with anticoagulation antibiotics and antipsychotic agents.  He was seen by the palliative care team.  He is being discharged to nursing home at family's request.

## 2017-07-20 VITALS
RESPIRATION RATE: 17 BRPM | TEMPERATURE: 98 F | HEART RATE: 82 BPM | DIASTOLIC BLOOD PRESSURE: 74 MMHG | OXYGEN SATURATION: 95 % | SYSTOLIC BLOOD PRESSURE: 134 MMHG

## 2017-07-20 LAB
ANION GAP SERPL CALC-SCNC: 7 MMOL/L — SIGNIFICANT CHANGE UP (ref 5–17)
APTT BLD: 33.2 SEC — SIGNIFICANT CHANGE UP (ref 27.5–37.4)
BUN SERPL-MCNC: 10 MG/DL — SIGNIFICANT CHANGE UP (ref 7–23)
CALCIUM SERPL-MCNC: 9 MG/DL — SIGNIFICANT CHANGE UP (ref 8.5–10.1)
CHLORIDE SERPL-SCNC: 104 MMOL/L — SIGNIFICANT CHANGE UP (ref 96–108)
CO2 SERPL-SCNC: 30 MMOL/L — SIGNIFICANT CHANGE UP (ref 22–31)
CREAT SERPL-MCNC: 0.78 MG/DL — SIGNIFICANT CHANGE UP (ref 0.5–1.3)
GLUCOSE SERPL-MCNC: 81 MG/DL — SIGNIFICANT CHANGE UP (ref 70–99)
HCT VFR BLD CALC: 37.9 % — LOW (ref 39–50)
HGB BLD-MCNC: 13.4 G/DL — SIGNIFICANT CHANGE UP (ref 13–17)
INR BLD: 1.6 RATIO — HIGH (ref 0.88–1.16)
M PNEUMO IGM SER-ACNC: 32 UNITS/ML — SIGNIFICANT CHANGE UP
MCHC RBC-ENTMCNC: 31.9 PG — SIGNIFICANT CHANGE UP (ref 27–34)
MCHC RBC-ENTMCNC: 35.2 GM/DL — SIGNIFICANT CHANGE UP (ref 32–36)
MCV RBC AUTO: 90.6 FL — SIGNIFICANT CHANGE UP (ref 80–100)
MYCOPLASMA AG SPEC QL: NEGATIVE — SIGNIFICANT CHANGE UP
PLATELET # BLD AUTO: 206 K/UL — SIGNIFICANT CHANGE UP (ref 150–400)
POTASSIUM SERPL-MCNC: 4.1 MMOL/L — SIGNIFICANT CHANGE UP (ref 3.5–5.3)
POTASSIUM SERPL-SCNC: 4.1 MMOL/L — SIGNIFICANT CHANGE UP (ref 3.5–5.3)
PROTHROM AB SERPL-ACNC: 17.6 SEC — HIGH (ref 9.8–12.7)
RBC # BLD: 4.18 M/UL — LOW (ref 4.2–5.8)
RBC # FLD: 12.5 % — SIGNIFICANT CHANGE UP (ref 11–15)
SODIUM SERPL-SCNC: 141 MMOL/L — SIGNIFICANT CHANGE UP (ref 135–145)
WBC # BLD: 8.9 K/UL — SIGNIFICANT CHANGE UP (ref 3.8–10.5)
WBC # FLD AUTO: 8.9 K/UL — SIGNIFICANT CHANGE UP (ref 3.8–10.5)

## 2017-07-20 RX ADMIN — Medication 1 APPLICATION(S): at 05:12

## 2017-07-20 RX ADMIN — SERTRALINE 100 MILLIGRAM(S): 25 TABLET, FILM COATED ORAL at 12:47

## 2017-07-20 RX ADMIN — RIVAROXABAN 15 MILLIGRAM(S): KIT at 05:12

## 2017-07-20 NOTE — PROGRESS NOTE ADULT - PROVIDER SPECIALTY LIST ADULT
Internal Medicine
Palliative Care
Palliative Care
Pulmonology
Infectious Disease
Infectious Disease
Palliative Care

## 2017-07-20 NOTE — PROGRESS NOTE ADULT - ASSESSMENT
Adv dementia   poor quality of life   pt has poor prognosis  GOC and adv dir to be discussed
83 year old man with dementia admitted with right mid lobar segmental Pulmonary embolism and bibasilar pneumonia.
83 year old man with dementia admitted with right mid lobar segmental Pulmonary embolism and bibasilar pneumonia.
Adv dementia   AMS  Pnm  prognosis  pt is full code
community acquired pneumonia   ?? aspiration   chest xray today ; day 5 zosyn and vanco ; ;low vanco levels   no leucocytosis   procalcitonin today   if NEGATIVE dc antibiotics
community acquired pneumonia   ?? aspiration   chest xray today ; day 5 zosyn and vanco ; ;low vanco levels   no leucocytosis   procalcitonin today   if NEGATIVE dc antibiotics
no change   pt is comfortable
83 year old man with dementia admitted with right mid lobar segmental Pulmonary embolism and bibasilar pneumonia.
83 year old man with dementia admitted with right mid lobar segmental Pulmonary embolism and bibasilar pneumonia.

## 2017-07-20 NOTE — PROGRESS NOTE ADULT - SUBJECTIVE AND OBJECTIVE BOX
83 year male with HLD, Obesity, Dementia and Colon Cancer treated only with  Colectomy few years ago. Was noticed with altered mental status yesterday, was taken to [facility] . Had work up for CVA which was negative so was released . Today again was noticed , not talking and complained about SOB. Wife reported  that was complaining of chest pain from time to time.  Family also noticed some cough for few days. No smoker.  CTA chest positive for PE with suspected Pneumonia.  Elevated procalcitonin.     Allergies    No Known Allergies    Intolerances        MEDICATIONS  (STANDING):  sodium chloride 0.9%. 1000 milliLiter(s) (40 mL/Hr) IV Continuous <Continuous>  piperacillin/tazobactam IVPB. 3.375 Gram(s) IV Intermittent every 8 hours  vancomycin  IVPB   IV Intermittent   vancomycin  IVPB 750 milliGRAM(s) IV Intermittent every 12 hours  simvastatin 10 milliGRAM(s) Oral at bedtime  sertraline 100 milliGRAM(s) Oral daily  memantine 10 milliGRAM(s) Oral daily  QUEtiapine 50 milliGRAM(s) Oral at bedtime  rivaroxaban 15 milliGRAM(s) Oral two times a day  BACItracin   Ointment 1 Application(s) Topical two times a day    MEDICATIONS  (PRN):  acetaminophen   Tablet 650 milliGRAM(s) Oral every 6 hours PRN For Temp greater than 38 C (100.4 F)      REVIEW OF SYSTEMS:  unable to obtain   VITAL SIGNS:  T(C): 36.1 (07-19-17 @ 17:12), Max: 36.9 (07-18-17 @ 23:49)  T(F): 97 (07-19-17 @ 17:12), Max: 98.4 (07-18-17 @ 23:49)  HR: 62 (07-19-17 @ 17:12) (59 - 69)  BP: 126/55 (07-19-17 @ 17:12) (115/67 - 146/71)  RR: 16 (07-19-17 @ 17:12) (16 - 18)  SpO2: 99% (07-19-17 @ 17:12) (94% - 99%)  Wt(kg): --    PHYSICAL EXAM:    GENERAL: not in any distress  HEENT: Neck is supple, normocephalic, atraumatic   CHEST/LUNG: Clear bilaterally; No rales, rhonchi, wheezing  HEART: Regular rate and rhythm; No murmurs, rubs, or gallops  ABDOMEN: Soft, Nontender, Nondistended; Bowel sounds present, no rebound   EXTREMITIES:  2+ Peripheral Pulses, No clubbing, cyanosis, or edema  SKIN: No rashes or lesions  BACK: no pressor sore   NERVOUS SYSTEM:  Alert &confused      LABS:                         12.5   7.9   )-----------( 182      ( 19 Jul 2017 06:26 )             35.0     07-19    141  |  105  |  8   ----------------------------<  83  3.9   |  28  |  0.84    Ca    8.6      19 Jul 2017 06:26        PT/INR - ( 19 Jul 2017 06:26 )   PT: 14.5 sec;   INR: 1.32 ratio         PTT - ( 19 Jul 2017 06:26 )  PTT:30.7 sec                                Legionella Antigen, Urine: Negative (07-18 @ 23:32)        Radiology:
83 year old man with history of dementia presenting to the emergency from [facility] for evaluation of syncope. He was said to have suddenly become short of breath and then passed out.  In the emergency room he was found to have both pneumonia and pulmonary embolism. Patient was seen at the [facility] emergency room yesterday for similar symptoms.  Work up was negative. (2017 20:04)        MEDICATIONS  (STANDING):  sodium chloride 0.9%. 1000 milliLiter(s) (40 mL/Hr) IV Continuous <Continuous>  piperacillin/tazobactam IVPB. 3.375 Gram(s) IV Intermittent every 8 hours  vancomycin  IVPB   IV Intermittent   vancomycin  IVPB 750 milliGRAM(s) IV Intermittent every 12 hours  simvastatin 10 milliGRAM(s) Oral at bedtime  sertraline 100 milliGRAM(s) Oral daily  memantine 10 milliGRAM(s) Oral daily  QUEtiapine 50 milliGRAM(s) Oral at bedtime  rivaroxaban 15 milliGRAM(s) Oral two times a day  rivaroxaban 20 milliGRAM(s) Oral every 24 hours    MEDICATIONS  (PRN):  heparin  Injectable 6500 Unit(s) IV Push every 6 hours PRN For aPTT less than 40  heparin  Injectable 3000 Unit(s) IV Push every 6 hours PRN For aPTT between 40 - 57  acetaminophen   Tablet 650 milliGRAM(s) Oral every 6 hours PRN For Temp greater than 38 C (100.4 F)      REVIEW OF SYSTEMS:  unable to obtain   cough plus   congested   no pain anywhere   feels weak   VITAL SIGNS:  T(C): 36.4 (17 @ 11:59), Max: 38 (17 @ 20:15)  T(F): 97.6 (17 @ 11:59), Max: 100.4 (17 @ 20:15)  HR: 58 (17 @ 11:59) (58 - 77)  BP: 101/47 (17 @ 11:59) (101/47 - 151/69)  RR: 17 (17 @ 11:59) (16 - 18)  SpO2: 92% (17 @ 11:59) (92% - 98%)  Wt(kg): --  today max 100.5 yesterday   PHYSICAL EXAM:    GENERAL: not in any distress  HEENT: Neck is supple, normocephalic, atraumatic   CHEST/LUNG: rhonchi  bilaterally;   HEART: Regular rate and rhythm; No murmurs, rubs, or gallops  ABDOMEN: Soft, Nontender, Nondistended; Bowel sounds present, no rebound   EXTREMITIES:  2+ Peripheral Pulses, No clubbing, cyanosis, or edema  rt arm iv big dressing   GENITOURINARY: incontinent   SKIN: No rashes or lesions  BACK: no pressor sore   NERVOUS SYSTEM:  Alert & Oriented X1  PSYCH: normal affect     LABS:                         12.5   9.7   )-----------( 158      ( 2017 08:45 )             35.5             PTT - ( 2017 08:45 )  PTT:83.3 sec  Urinalysis Basic - ( 2017 18:43 )    Color: Yellow / Appearance: Clear / S.015 / pH: x  Gluc: x / Ketone: Negative  / Bili: Negative / Urobili: 4 mg/dL   Blood: x / Protein: 15 mg/dL / Nitrite: Negative   Leuk Esterase: Negative / RBC: 0-2 /HPF / WBC x   Sq Epi: x / Non Sq Epi: x / Bacteria: x                    Vancomycin Level, Trough: 7.5 ug/mL ( @ 07:00)                      Radiology:    < from: Xray Chest 1 View AP/PA (17 @ 12:12) >  NTERPRETATION:  CLINICAL INDICATION: CVA; dyspnea    EXAM:  Portable semiupright AP chest from 2017 at 1206. Compared to prior   study from 2007.    IMPRESSION:  Lungs underinflated.    Ill-defined increased markings and patchy opacification in both lung   bases compatible with subsegmental atelectasis and/or   infiltrate/pneumonia in the proper clinical context, follow-up suggested   as warranted.    Clear remaining visualized lungs. No pleural effusions or pneumothorax.    Heart size and mediastinal width inaccurately assessed on this   projection. Aortic calcifications again noted.    Slight rightward lower tracheal deviation due to pressure effect from the   adjacent aortic arch. Midline upper trachea.    Generalized osteopenia and spinal degenerative changes again noted.                  CARLA LABOY M.D., ATTENDING RADIOLOGIST  This document has been electronically signed. 2017  3:12PM        < end of copied text >
Chief Complaint/Reason for Admission: syncope	  History of Present Illness: 	  83 year old man with history of dementia presenting to the emergency from [facility] for evaluation of syncope. He was said to have suddenly become short of breath and then passed out.  In the emergency room he was found to have both pneumonia and pulmonary embolism. Patient was seen at the [facility] emergency room yesterday for similar symptoms.  Work up was negative.    Review of Systems:  Review of Systems: REVIEW OF SYSTEMS:  CONSTITUTIONAL: No fever, weight loss, or fatigue EYES: No eye pain, visual disturbances, or discharge ENMT:  No difficulty hearing, tinnitus, vertigo; No sinus or throat pain NECK: No pain or stiffness BREASTS: No pain, masses, or nipple discharge RESPIRATORY: No cough, wheezing, chills or hemoptysis; No shortness of breath CARDIOVASCULAR: No chest pain, palpitations, dizziness, or leg swelling GASTROINTESTINAL: No abdominal or epigastric pain. No nausea, vomiting, or hematemesis; No diarrhea or constipation. No melena or hematochezia. GENITOURINARY: No dysuria, frequency, hematuria, or incontinence NEUROLOGICAL: No headaches, memory loss, loss of strength, numbness, or tremors SKIN: No itching, burning, rashes, or lesions  LYMPH NODES: No enlarged glands ENDOCRINE: No heat or cold intolerance; No hair loss MUSCULOSKELETAL: No joint pain or swelling; No muscle, back, or extremity pain PSYCHIATRIC: No depression, anxiety, mood swings, or difficulty sleeping HEME/LYMPH: No easy bruising, or bleeding gums ALLERGY AND IMMUNOLOGIC: No hives or eczema	      Allergies and Intolerances:        Allergies:  	No Known Allergies:     Home Medications:   * Patient Currently Takes Medications as of 14-Jul-2017 19:54 documented in Prescription Writer  · 	Namenda XR 21 mg oral capsule, extended release: 1 cap(s) orally once a day, Last Dose Taken:    · 	sertraline 100 mg oral tablet: 1 tab(s) orally once a day, Last Dose Taken:    · 	simvastatin 10 mg oral tablet: 1 tab(s) orally once a day (at bedtime), Last Dose Taken:      . .    Patient History:   Past Medical History:  Alcohol abuse    Colon cancer  s/p colectomy  Dementia  mild  Hypercholesterolemia.    Past Surgical History:  Colon cancer  s/p colectomy  History of cataract extraction  bilateral.    Family History:  No pertinent family history in first degree relatives.    Tobacco Screening:  · Core Measure Site	Yes	    Risk Assessment:   Present on Admission:  Deep Venous Thrombosis	suspected	  Pulmonary Embolus	yes	    Heart Failure:  Does this patient have a history of or has been diagnosed with heart failure? unknown.      Physical Exam:  Physical Exam: PHYSICAL EXAM:  GENERAL: NAD, well-groomed, well-developed, confused and disoriented. HEAD:  Atraumatic, Normocephalic EYES: EOMI, PERRLA, conjunctiva and sclera clear ENMT: No tonsillar erythema, exudates, or enlargement; Moist mucous membranes, Good dentition, No lesions NECK: Supple, No JVD, Normal thyroid NERVOUS SYSTEM:  Alert & Oriented X3, Good concentration; Motor Strength 5/5 B/L upper and lower extremities; DTRs 2+ intact and symmetric CHEST/LUNG: Clear to percussion bilaterally; No rales, rhonchi, wheezing, or rubs HEART: Regular rate and rhythm; No murmurs, rubs, or gallops ABDOMEN: Soft, Nontender, Nondistended; Bowel sounds present EXTREMITIES:  2+ Peripheral Pulses, No clubbing, cyanosis, or edema LYMPH: No lymphadenopathy noted SKIN: No rashes or lesions	      Labs and Results:  Labs, Radiology, Cardiology, and Other Results: CBC Full  -  ( 14 Jul 2017 10:25 ) WBC Count : 11.0 K/uL Hemoglobin : 12.0 g/dL Hematocrit : 34.6 % Platelet Count - Automated : 102 K/uL Mean Cell Volume : 88.6 fl Mean Cell Hemoglobin : 30.6 pg Mean Cell Hemoglobin Concentration : 34.6 gm/dL Auto Neutrophil # : 9.5 K/uL Auto Lymphocyte # : 0.8 K/uL Auto Monocyte # : 0.7 K/uL Auto Eosinophil # : 0.0 K/uL Auto Basophil # : 0.1 K/uL Auto Neutrophil % : 86.2 % Auto Lymphocyte % : 7.1 % Auto Monocyte % : 6.2 % Auto Eosinophil % : 0.0 % Auto Basophil % : 0.5 % 14 Jul 2017 10:25  136    |  103    |  19    ----------------------------<  129   5.5     |  25     |  1.10   Ca    8.5        14 Jul 2017 10:25  TPro  6.7    /  Alb  2.5    /  TBili  1.0    /  DBili  x      /  AST  58     /  ALT  31     /  AlkPhos  88     14 Jul 2017 10:25 < from: CT Angio Chest w/ IV Cont (07.14.17 @ 12:44) >   CT ANGIO CHEST (W)AW IC                         PROCEDURE DATE:  07/14/2017       IMPRESSION:   Right middle lobe segmental pulmonary emboli.  Bibasilar pneumonia.  Other incidental findings as above.	    Assessment and Plan:   Assessment:  · Assessment		  83 year old man with dementia admitted with right mid lobar segmental Pulmonary embolism and bibasilar pneumonia.    Problem/Plan - 1:  ·  Problem: Other pulmonary embolism without acute cor pulmonale, unspecified chronicity.  Plan: associated with syncopal episode.  Agree with intravenous heparin.     Problem/Plan - 2:  ·  Problem: Pneumonia of right lung due to other aerobic Gram-negative bacteria, unspecified part of lung.  Plan: Continue intravenous antibiotics.     Problem/Plan - 3:  ·  Problem: Hypercholesterolemia.  Plan: continue statins.     Problem/Plan - 4:  ·  Problem: Alcohol abuse.     Problem/Plan - 5:  ·  Problem: Late onset Alzheimer's disease with behavioral disturbance.  Plan: continue mementine.     Problem/Plan - 6:  Problem: Malignant neoplasm of colon, unspecified part of colon.
HPI:  83 year male with HLD, Obesity, Dementia and Colon Cancer treated only with  Colectomy few years ago. Was noticed with altered mental status yesterday, was taken to [facility] . Had work up for CVA which was negative so was released . Today again was noticed , not talking and complained about SOB. Wife reported  that was complaining of chest pain from time to time.  Family also noticed some cough for few days. No smoker.  CTA chest positive for PE with suspected Pneumonia.  Elevated procalcitonin.     OVERNIGHT EVENTS:  Awake and comfortable    Vital Signs Last 24 Hrs  T(C): 36.6 (19 Jul 2017 12:47), Max: 36.9 (18 Jul 2017 23:49)  T(F): 97.9 (19 Jul 2017 12:47), Max: 98.4 (18 Jul 2017 23:49)  HR: 59 (19 Jul 2017 12:47) (59 - 69)  BP: 134/66 (19 Jul 2017 12:47) (115/67 - 150/81)  BP(mean): --  RR: 16 (19 Jul 2017 12:47) (16 - 18)  SpO2: 99% (19 Jul 2017 12:47) (94% - 99%)    PHYSICAL EXAM:  GEN:         Awake, responsive and comfortable.  HEENT:    Normal.    RESP:      no wheezing  CVS:          Regular rate and rhythm.   ABD:         Soft, non-tender, non-distended;     MEDICATIONS  (STANDING):  sodium chloride 0.9%. 1000 milliLiter(s) (40 mL/Hr) IV Continuous <Continuous>  piperacillin/tazobactam IVPB. 3.375 Gram(s) IV Intermittent every 8 hours  vancomycin  IVPB   IV Intermittent   vancomycin  IVPB 750 milliGRAM(s) IV Intermittent every 12 hours  simvastatin 10 milliGRAM(s) Oral at bedtime  sertraline 100 milliGRAM(s) Oral daily  memantine 10 milliGRAM(s) Oral daily  QUEtiapine 50 milliGRAM(s) Oral at bedtime  rivaroxaban 15 milliGRAM(s) Oral two times a day  BACItracin   Ointment 1 Application(s) Topical two times a day    MEDICATIONS  (PRN):  acetaminophen   Tablet 650 milliGRAM(s) Oral every 6 hours PRN For Temp greater than 38 C (100.4 F)    LABS:                        12.5   7.9   )-----------( 182      ( 19 Jul 2017 06:26 )             35.0     07-19    141  |  105  |  8   ----------------------------<  83  3.9   |  28  |  0.84    Ca    8.6      19 Jul 2017 06:26      PT/INR - ( 19 Jul 2017 06:26 )   PT: 14.5 sec;   INR: 1.32 ratio       PTT - ( 19 Jul 2017 06:26 )  PTT:30.7 sec  07-14 @ 13:36  pH: 7.40  pCO2: 42  pO2: 86  SaO2: 96    ASSESSMENT AND PLAN:
INTERVAL HPI/OVERNIGHT EVENTS:      ANTIBIOTICS:                  DATE STARTED:  ANTIBIOTICS:                  DATE STARTED:  ANTIBIOTICS:                  DATE STARTED:      Antimicrobial:  piperacillin/tazobactam IVPB. 3.375 Gram(s) IV Intermittent every 8 hours  vancomycin  IVPB   IV Intermittent   vancomycin  IVPB 750 milliGRAM(s) IV Intermittent every 12 hours    Cardiovascular:    Pulmonary:    Hematalogic:  heparin  Infusion. 1300 Unit(s)/Hr IV Continuous <Continuous>  heparin  Injectable 6500 Unit(s) IV Push every 6 hours PRN  heparin  Injectable 3000 Unit(s) IV Push every 6 hours PRN    Other:  sodium chloride 0.9%. 1000 milliLiter(s) IV Continuous <Continuous>  simvastatin 10 milliGRAM(s) Oral at bedtime  sertraline 100 milliGRAM(s) Oral daily  memantine 10 milliGRAM(s) Oral daily  QUEtiapine 50 milliGRAM(s) Oral at bedtime  QUEtiapine 25 milliGRAM(s) Oral once PRN    sodium chloride 0.9%. 1000 milliLiter(s) IV Continuous <Continuous>  piperacillin/tazobactam IVPB. 3.375 Gram(s) IV Intermittent every 8 hours  vancomycin  IVPB   IV Intermittent   vancomycin  IVPB 750 milliGRAM(s) IV Intermittent every 12 hours  simvastatin 10 milliGRAM(s) Oral at bedtime  sertraline 100 milliGRAM(s) Oral daily  memantine 10 milliGRAM(s) Oral daily  QUEtiapine 50 milliGRAM(s) Oral at bedtime  QUEtiapine 25 milliGRAM(s) Oral once PRN  heparin  Infusion. 1300 Unit(s)/Hr IV Continuous <Continuous>  heparin  Injectable 6500 Unit(s) IV Push every 6 hours PRN  heparin  Injectable 3000 Unit(s) IV Push every 6 hours PRN    Drug Dosing Weight  Height (cm): 172.72 (2017 09:37)  Weight (kg): 81.5 (2017 18:52)  BMI (kg/m2): 27.3 (2017 18:52)  BSA (m2): 1.95 (2017 18:52)        NAVARRETE: [ ] YES [ ] NO    DATE INSERTED:  REMOVE:  [ ] YES [ ] NO  EXPLAIN:      PAST MEDICAL & SURGICAL HISTORY:  Alcohol abuse  Colon cancer: s/p colectomy  Dementia: mild  Hypercholesterolemia  Colon cancer: s/p colectomy  History of cataract extraction: bilateral      REVIEW OF SYSTEMS:    CONSTITUTIONAL: No fever, weight loss, or fatigue  EYES: No eye pain, visual disturbances, or discharge  ENMT:  No difficulty hearing, tinnitus, vertigo; No sinus or throat pain  NECK: No pain or stiffness  BREASTS: No pain, masses, or nipple discharge  RESPIRATORY: No cough, wheezing, chills or hemoptysis; No shortness of breath  CARDIOVASCULAR: No chest pain, palpitations, dizziness, or leg swelling  GASTROINTESTINAL: No abdominal or epigastric pain. No nausea, vomiting, or hematemesis; No diarrhea or constipation. No melena or hematochezia.  GENITOURINARY: No dysuria, frequency, hematuria, or incontinence  NEUROLOGICAL: No headaches, memory loss, loss of strength, numbness, or tremors  SKIN: No itching, burning, rashes, or lesions   LYMPH NODES: No enlarged glands  ENDOCRINE: No heat or cold intolerance; No hair loss  MUSCULOSKELETAL: No joint pain or swelling; No muscle, back, or extremity pain  PSYCHIATRIC: No depression, anxiety, mood swings, or difficulty sleeping  HEME/LYMPH: No easy bruising, or bleeding gums  ALLERGY AND IMMUNOLOGIC: No hives or eczema      T(C): 37.1 (07-15-17 @ 17:22), Max: 37.1 (07-15-17 @ 11:19)  HR: 70 (07-15-17 @ 17:22) (60 - 72)  BP: 117/64 (07-15-17 @ 17:22) (117/64 - 139/76)  RR: 18 (07-15-17 @ 17:22) (15 - 20)  SpO2: 93% (07-15-17 @ 17:22) (93% - 98%)  Wt(kg): --    ABG - ( 2017 13:36 )  pH: x     /  pCO2: 42    /  pO2: 86    / HCO3: 26    / Base Excess: 1.2   /  SaO2: 96                  I&O's Detail    2017 07:01  -  15 Jul 2017 07:00  --------------------------------------------------------  IN:    heparin  Infusion.: 132 mL    sodium chloride 0.9%.: 900 mL  Total IN: 1032 mL    OUT:  Total OUT: 0 mL    Total NET: 1032 mL      15 Jul 2017 07:01  -  15 Jul 2017 17:45  --------------------------------------------------------  IN:    Oral Fluid: 720 mL    sodium chloride 0.9%.: 280 mL    Solution: 100 mL  Total IN: 1100 mL    OUT:    Voided: 300 mL  Total OUT: 300 mL    Total NET: 800 mL            PHYSICAL EXAM:    GENERAL: NAD, well-groomed, well-developed  HEAD:  Atraumatic, Normocephalic  EYES: EOMI, PERRLA, conjunctiva and sclera clear  ENMT: No tonsillar erythema, exudates, or enlargement; Moist mucous membranes, Good dentition, No lesions  NECK: Supple, No JVD, Normal thyroid  NERVOUS SYSTEM:  Alert & Oriented X3, Good concentration; Motor Strength 5/5 B/L upper and lower extremities; DTRs 2+ intact and symmetric  CHEST/LUNG: Clear to percussion bilaterally; No rales, rhonchi, wheezing, or rubs  HEART: Regular rate and rhythm; No murmurs, rubs, or gallops  ABDOMEN: Soft, Nontender, Nondistended; Bowel sounds present  EXTREMITIES:  2+ Peripheral Pulses, No clubbing, cyanosis, or edema  LYMPH: No lymphadenopathy noted  SKIN: No rashes or lesions      LABS:  CBC Full  -  ( 15 Jul 2017 07:43 )  WBC Count : 9.4 K/uL  Hemoglobin : 11.1 g/dL  Hematocrit : 31.9 %  Platelet Count - Automated : 110 K/uL  Mean Cell Volume : 90.8 fl  Mean Cell Hemoglobin : 31.7 pg  Mean Cell Hemoglobin Concentration : 34.9 gm/dL  Auto Neutrophil # : x  Auto Lymphocyte # : x  Auto Monocyte # : x  Auto Eosinophil # : x  Auto Basophil # : x  Auto Neutrophil % : x  Auto Lymphocyte % : x  Auto Monocyte % : x  Auto Eosinophil % : x  Auto Basophil % : x    07-15    143  |  107  |  12  ----------------------------<  81  3.7   |  28  |  0.84    Ca    8.1<L>      15 Jul 2017 07:43  Mg     2.1     07-15    TPro  5.7<L>  /  Alb  2.3<L>  /  TBili  0.7  /  DBili  x   /  AST  20  /  ALT  22  /  AlkPhos  78  07-15    PT/INR - ( 2017 10:25 )   PT: 13.5 sec;   INR: 1.23 ratio         PTT - ( 15 Jul 2017 07:43 )  PTT:48.6 sec  Urinalysis Basic - ( 2017 11:17 )    Color: Yellow / Appearance: Clear / S.015 / pH: x  Gluc: x / Ketone: Trace  / Bili: Negative / Urobili: 4 mg/dL   Blood: x / Protein: 30 mg/dL / Nitrite: Negative   Leuk Esterase: Trace / RBC: 3-5 /HPF / WBC 3-5   Sq Epi: x / Non Sq Epi: Few / Bacteria: x          RADIOLOGY & ADDITIONAL STUDIES:    < from: CT Angio Chest w/ IV Cont (17 @ 12:44) >    IMPRESSION:     Right middle lobe segmental pulmonary emboli.    Bibasilar pneumonia.    Other incidental findings as above.    < end of copied text >
INTERVAL HPI/OVERNIGHT EVENTS:      No new complaints.  Family wants patient placed in Nursing home.      Antimicrobial:  piperacillin/tazobactam IVPB. 3.375 Gram(s) IV Intermittent every 8 hours  vancomycin  IVPB   IV Intermittent   vancomycin  IVPB 750 milliGRAM(s) IV Intermittent every 12 hours    Cardiovascular:    Pulmonary:    Hematalogic:  heparin  Infusion. 1300 Unit(s)/Hr IV Continuous <Continuous>  heparin  Injectable 6500 Unit(s) IV Push every 6 hours PRN  heparin  Injectable 3000 Unit(s) IV Push every 6 hours PRN  rivaroxaban 15 milliGRAM(s) Oral two times a day  rivaroxaban 20 milliGRAM(s) Oral every 24 hours    Other:  sodium chloride 0.9%. 1000 milliLiter(s) IV Continuous <Continuous>  simvastatin 10 milliGRAM(s) Oral at bedtime  sertraline 100 milliGRAM(s) Oral daily  memantine 10 milliGRAM(s) Oral daily  QUEtiapine 50 milliGRAM(s) Oral at bedtime  acetaminophen   Tablet 650 milliGRAM(s) Oral every 6 hours PRN    sodium chloride 0.9%. 1000 milliLiter(s) IV Continuous <Continuous>  piperacillin/tazobactam IVPB. 3.375 Gram(s) IV Intermittent every 8 hours  vancomycin  IVPB   IV Intermittent   vancomycin  IVPB 750 milliGRAM(s) IV Intermittent every 12 hours  simvastatin 10 milliGRAM(s) Oral at bedtime  sertraline 100 milliGRAM(s) Oral daily  memantine 10 milliGRAM(s) Oral daily  QUEtiapine 50 milliGRAM(s) Oral at bedtime  heparin  Infusion. 1300 Unit(s)/Hr IV Continuous <Continuous>  heparin  Injectable 6500 Unit(s) IV Push every 6 hours PRN  heparin  Injectable 3000 Unit(s) IV Push every 6 hours PRN  acetaminophen   Tablet 650 milliGRAM(s) Oral every 6 hours PRN  rivaroxaban 15 milliGRAM(s) Oral two times a day  rivaroxaban 20 milliGRAM(s) Oral every 24 hours    Drug Dosing Weight  Height (cm): 172.72 (2017 09:37)  Weight (kg): 81.5 (2017 18:52)  BMI (kg/m2): 27.3 (2017 18:52)  BSA (m2): 1.95 (2017 18:52)        NAVARRETE: [ ] YES [ ] NO    DATE INSERTED:  REMOVE:  [ ] YES [ ] NO  EXPLAIN:      PAST MEDICAL & SURGICAL HISTORY:  Alcohol abuse  Colon cancer: s/p colectomy  Dementia: mild  Hypercholesterolemia  Colon cancer: s/p colectomy  History of cataract extraction: bilateral      REVIEW OF SYSTEMS:    CONSTITUTIONAL: No fever, weight loss, or fatigue  EYES: No eye pain, visual disturbances, or discharge  ENMT:  No difficulty hearing, tinnitus, vertigo; No sinus or throat pain  NECK: No pain or stiffness  BREASTS: No pain, masses, or nipple discharge  RESPIRATORY: No cough, wheezing, chills or hemoptysis; No shortness of breath  CARDIOVASCULAR: No chest pain, palpitations, dizziness, or leg swelling  GASTROINTESTINAL: No abdominal or epigastric pain. No nausea, vomiting, or hematemesis; No diarrhea or constipation. No melena or hematochezia.  GENITOURINARY: No dysuria, frequency, hematuria, or incontinence  NEUROLOGICAL: No headaches, memory loss, loss of strength, numbness, or tremors  SKIN: No itching, burning, rashes, or lesions   LYMPH NODES: No enlarged glands  ENDOCRINE: No heat or cold intolerance; No hair loss  MUSCULOSKELETAL: No joint pain or swelling; No muscle, back, or extremity pain  PSYCHIATRIC: No depression, anxiety, mood swings, or difficulty sleeping  HEME/LYMPH: No easy bruising, or bleeding gums  ALLERGY AND IMMUNOLOGIC: No hives or eczema      T(C): 36.4 (17 @ 11:59), Max: 38 (17 @ 20:15)  HR: 58 (17 @ 11:59) (58 - 77)  BP: 101/47 (17 @ 11:59) (101/47 - 151/69)  RR: 17 (17 @ 11:59) (16 - 18)  SpO2: 92% (17 @ 11:59) (92% - 98%)  Wt(kg): --        I&O's Detail    2017 07:01  -  2017 07:00  --------------------------------------------------------  IN:    heparin  Infusion.: 180 mL    Oral Fluid: 720 mL    sodium chloride 0.9%.: 480 mL    Solution: 250 mL    Solution: 200 mL  Total IN: 1830 mL    OUT:  Total OUT: 0 mL    Total NET: 1830 mL            PHYSICAL EXAM:    GENERAL: NAD, well-groomed, well-developed  HEAD:  Atraumatic, Normocephalic  EYES: EOMI, PERRLA, conjunctiva and sclera clear  ENMT: No tonsillar erythema, exudates, or enlargement; Moist mucous membranes, Good dentition, No lesions  NECK: Supple, No JVD, Normal thyroid  NERVOUS SYSTEM:  Alert & Oriented X3, Good concentration; Motor Strength 5/5 B/L upper and lower extremities; DTRs 2+ intact and symmetric  CHEST/LUNG: Clear to percussion bilaterally; No rales, rhonchi, wheezing, or rubs  HEART: Regular rate and rhythm; No murmurs, rubs, or gallops  ABDOMEN: Soft, Nontender, Nondistended; Bowel sounds present  EXTREMITIES:  2+ Peripheral Pulses, No clubbing, cyanosis, or edema  LYMPH: No lymphadenopathy noted  SKIN: No rashes or lesions      LABS:  CBC Full  -  ( 2017 08:45 )  WBC Count : 9.7 K/uL  Hemoglobin : 12.5 g/dL  Hematocrit : 35.5 %  Platelet Count - Automated : 158 K/uL  Mean Cell Volume : 91.0 fl  Mean Cell Hemoglobin : 32.1 pg  Mean Cell Hemoglobin Concentration : 35.2 gm/dL  Auto Neutrophil # : x  Auto Lymphocyte # : x  Auto Monocyte # : x  Auto Eosinophil # : x  Auto Basophil # : x  Auto Neutrophil % : x  Auto Lymphocyte % : x  Auto Monocyte % : x  Auto Eosinophil % : x  Auto Basophil % : x          PTT - ( 2017 08:45 )  PTT:83.3 sec  Urinalysis Basic - ( 2017 18:43 )    Color: Yellow / Appearance: Clear / S.015 / pH: x  Gluc: x / Ketone: Negative  / Bili: Negative / Urobili: 4 mg/dL   Blood: x / Protein: 15 mg/dL / Nitrite: Negative   Leuk Esterase: Negative / RBC: 0-2 /HPF / WBC x   Sq Epi: x / Non Sq Epi: x / Bacteria: x          RADIOLOGY & ADDITIONAL STUDIES:
INTERVAL HPI/OVERNIGHT EVENTS:    No new complaint.  Pulmonary consult noted.        Antimicrobial:  piperacillin/tazobactam IVPB. 3.375 Gram(s) IV Intermittent every 8 hours  vancomycin  IVPB   IV Intermittent   vancomycin  IVPB 750 milliGRAM(s) IV Intermittent every 12 hours    Cardiovascular:    Pulmonary:    Hematalogic:  heparin  Infusion. 1300 Unit(s)/Hr IV Continuous <Continuous>  heparin  Injectable 6500 Unit(s) IV Push every 6 hours PRN  heparin  Injectable 3000 Unit(s) IV Push every 6 hours PRN    Other:  sodium chloride 0.9%. 1000 milliLiter(s) IV Continuous <Continuous>  simvastatin 10 milliGRAM(s) Oral at bedtime  sertraline 100 milliGRAM(s) Oral daily  memantine 10 milliGRAM(s) Oral daily  QUEtiapine 50 milliGRAM(s) Oral at bedtime    sodium chloride 0.9%. 1000 milliLiter(s) IV Continuous <Continuous>  piperacillin/tazobactam IVPB. 3.375 Gram(s) IV Intermittent every 8 hours  vancomycin  IVPB   IV Intermittent   vancomycin  IVPB 750 milliGRAM(s) IV Intermittent every 12 hours  simvastatin 10 milliGRAM(s) Oral at bedtime  sertraline 100 milliGRAM(s) Oral daily  memantine 10 milliGRAM(s) Oral daily  QUEtiapine 50 milliGRAM(s) Oral at bedtime  heparin  Infusion. 1300 Unit(s)/Hr IV Continuous <Continuous>  heparin  Injectable 6500 Unit(s) IV Push every 6 hours PRN  heparin  Injectable 3000 Unit(s) IV Push every 6 hours PRN    Drug Dosing Weight  Height (cm): 172.72 (2017 09:37)  Weight (kg): 81.5 (2017 18:52)  BMI (kg/m2): 27.3 (2017 18:52)  BSA (m2): 1.95 (2017 18:52)        NAVARRETE: [ ] YES [ ] NO    DATE INSERTED:  REMOVE:  [ ] YES [ ] NO  EXPLAIN:      PAST MEDICAL & SURGICAL HISTORY:  Alcohol abuse  Colon cancer: s/p colectomy  Dementia: mild  Hypercholesterolemia  Colon cancer: s/p colectomy  History of cataract extraction: bilateral      REVIEW OF SYSTEMS:    CONSTITUTIONAL: No fever, weight loss, or fatigue  EYES: No eye pain, visual disturbances, or discharge  ENMT:  No difficulty hearing, tinnitus, vertigo; No sinus or throat pain  NECK: No pain or stiffness  BREASTS: No pain, masses, or nipple discharge  RESPIRATORY: No cough, wheezing, chills or hemoptysis; No shortness of breath  CARDIOVASCULAR: No chest pain, palpitations, dizziness, or leg swelling  GASTROINTESTINAL: No abdominal or epigastric pain. No nausea, vomiting, or hematemesis; No diarrhea or constipation. No melena or hematochezia.  GENITOURINARY: No dysuria, frequency, hematuria, or incontinence  NEUROLOGICAL: No headaches, memory loss, loss of strength, numbness, or tremors  SKIN: No itching, burning, rashes, or lesions   LYMPH NODES: No enlarged glands  ENDOCRINE: No heat or cold intolerance; No hair loss  MUSCULOSKELETAL: No joint pain or swelling; No muscle, back, or extremity pain  PSYCHIATRIC: No depression, anxiety, mood swings, or difficulty sleeping  HEME/LYMPH: No easy bruising, or bleeding gums  ALLERGY AND IMMUNOLOGIC: No hives or eczema      T(C): 36.6 (17 @ 11:03), Max: 36.7 (17 @ 23:45)  HR: 68 (17 @ 11:03) (60 - 68)  BP: 106/60 (17 @ 11:03) (106/60 - 133/67)  RR: 18 (17 @ 11:03) (17 - 18)  SpO2: 96% (17 @ 11:03) (95% - 96%)  Wt(kg): --        I&O's Detail    2017 07:  -  2017 07:00  --------------------------------------------------------  IN:    Oral Fluid: 236 mL  Total IN: 236 mL    OUT:    Voided: 500 mL  Total OUT: 500 mL    Total NET: -264 mL      2017 07:  -  2017 16:11  --------------------------------------------------------  IN:    Oral Fluid: 480 mL  Total IN: 480 mL    OUT:  Total OUT: 0 mL    Total NET: 480 mL            PHYSICAL EXAM:    GENERAL: NAD, well-groomed, well-developed  HEAD:  Atraumatic, Normocephalic  EYES: EOMI, PERRLA, conjunctiva and sclera clear  ENMT: No tonsillar erythema, exudates, or enlargement; Moist mucous membranes, Good dentition, No lesions  NECK: Supple, No JVD, Normal thyroid  NERVOUS SYSTEM:  Alert & Oriented X3, Good concentration; Motor Strength 5/5 B/L upper and lower extremities; DTRs 2+ intact and symmetric  CHEST/LUNG: Clear to percussion bilaterally; No rales, rhonchi, wheezing, or rubs  HEART: Regular rate and rhythm; No murmurs, rubs, or gallops  ABDOMEN: Soft, Nontender, Nondistended; Bowel sounds present  EXTREMITIES:  2+ Peripheral Pulses, No clubbing, cyanosis, or edema  LYMPH: No lymphadenopathy noted  SKIN: No rashes or lesions      LABS:  CBC Full  -  ( 2017 07:30 )  WBC Count : 10.7 K/uL  Hemoglobin : 11.7 g/dL  Hematocrit : 33.3 %  Platelet Count - Automated : 148 K/uL  Mean Cell Volume : 90.7 fl  Mean Cell Hemoglobin : 31.8 pg  Mean Cell Hemoglobin Concentration : 35.1 gm/dL  Auto Neutrophil # : x  Auto Lymphocyte # : x  Auto Monocyte # : x  Auto Eosinophil # : x  Auto Basophil # : x  Auto Neutrophil % : x  Auto Lymphocyte % : x  Auto Monocyte % : x  Auto Eosinophil % : x  Auto Basophil % : x    -    141  |  106  |  10  ----------------------------<  82  3.6   |  28  |  0.88    Ca    8.1<L>      2017 07:00  Mg     1.9     07-16      PTT - ( 2017 07:30 )  PTT:74.4 sec  Urinalysis Basic - ( 2017 18:43 )    Color: Yellow / Appearance: Clear / S.015 / pH: x  Gluc: x / Ketone: Negative  / Bili: Negative / Urobili: 4 mg/dL   Blood: x / Protein: 15 mg/dL / Nitrite: Negative   Leuk Esterase: Negative / RBC: 0-2 /HPF / WBC x   Sq Epi: x / Non Sq Epi: x / Bacteria: x          RADIOLOGY & ADDITIONAL STUDIES:
INTERVAL HPI/OVERNIGHT EVENTS:    On full dose heparin for PE.  Appears not a good candidate for long term anticoagulation.  Continue heparin and antibiotics for now      Antimicrobial:  piperacillin/tazobactam IVPB. 3.375 Gram(s) IV Intermittent every 8 hours  vancomycin  IVPB   IV Intermittent   vancomycin  IVPB 750 milliGRAM(s) IV Intermittent every 12 hours    Cardiovascular:    Pulmonary:    Hematalogic:  heparin  Infusion. 1300 Unit(s)/Hr IV Continuous <Continuous>  heparin  Injectable 6500 Unit(s) IV Push every 6 hours PRN  heparin  Injectable 3000 Unit(s) IV Push every 6 hours PRN    Other:  sodium chloride 0.9%. 1000 milliLiter(s) IV Continuous <Continuous>  simvastatin 10 milliGRAM(s) Oral at bedtime  sertraline 100 milliGRAM(s) Oral daily  memantine 10 milliGRAM(s) Oral daily  QUEtiapine 50 milliGRAM(s) Oral at bedtime    sodium chloride 0.9%. 1000 milliLiter(s) IV Continuous <Continuous>  piperacillin/tazobactam IVPB. 3.375 Gram(s) IV Intermittent every 8 hours  vancomycin  IVPB   IV Intermittent   vancomycin  IVPB 750 milliGRAM(s) IV Intermittent every 12 hours  simvastatin 10 milliGRAM(s) Oral at bedtime  sertraline 100 milliGRAM(s) Oral daily  memantine 10 milliGRAM(s) Oral daily  QUEtiapine 50 milliGRAM(s) Oral at bedtime  heparin  Infusion. 1300 Unit(s)/Hr IV Continuous <Continuous>  heparin  Injectable 6500 Unit(s) IV Push every 6 hours PRN  heparin  Injectable 3000 Unit(s) IV Push every 6 hours PRN    Drug Dosing Weight  Height (cm): 172.72 (14 Jul 2017 09:37)  Weight (kg): 81.5 (14 Jul 2017 18:52)  BMI (kg/m2): 27.3 (14 Jul 2017 18:52)  BSA (m2): 1.95 (14 Jul 2017 18:52)        NAVARRETE: [ ] YES [ ] NO    DATE INSERTED:  REMOVE:  [ ] YES [ ] NO  EXPLAIN:      PAST MEDICAL & SURGICAL HISTORY:  Alcohol abuse  Colon cancer: s/p colectomy  Dementia: mild  Hypercholesterolemia  Colon cancer: s/p colectomy  History of cataract extraction: bilateral      REVIEW OF SYSTEMS:    CONSTITUTIONAL: No fever, weight loss, or fatigue  EYES: No eye pain, visual disturbances, or discharge  ENMT:  No difficulty hearing, tinnitus, vertigo; No sinus or throat pain  NECK: No pain or stiffness  BREASTS: No pain, masses, or nipple discharge  RESPIRATORY: No cough, wheezing, chills or hemoptysis; No shortness of breath  CARDIOVASCULAR: No chest pain, palpitations, dizziness, or leg swelling  GASTROINTESTINAL: No abdominal or epigastric pain. No nausea, vomiting, or hematemesis; No diarrhea or constipation. No melena or hematochezia.  GENITOURINARY: No dysuria, frequency, hematuria, or incontinence  NEUROLOGICAL: No headaches, memory loss, loss of strength, numbness, or tremors  SKIN: No itching, burning, rashes, or lesions   LYMPH NODES: No enlarged glands  ENDOCRINE: No heat or cold intolerance; No hair loss  MUSCULOSKELETAL: No joint pain or swelling; No muscle, back, or extremity pain  PSYCHIATRIC: No depression, anxiety, mood swings, or difficulty sleeping  HEME/LYMPH: No easy bruising, or bleeding gums  ALLERGY AND IMMUNOLOGIC: No hives or eczema      T(C): 37.7 (07-16-17 @ 10:40), Max: 37.7 (07-16-17 @ 10:40)  HR: 57 (07-16-17 @ 10:40) (57 - 70)  BP: 146/71 (07-16-17 @ 10:40) (115/71 - 146/71)  RR: 16 (07-16-17 @ 10:40) (16 - 18)  SpO2: 90% (07-16-17 @ 10:40) (90% - 94%)  Wt(kg): --        I&O's Detail    15 Jul 2017 07:01  -  16 Jul 2017 07:00  --------------------------------------------------------  IN:    heparin  Infusion.: 180 mL    Oral Fluid: 960 mL    sodium chloride 0.9%.: 760 mL    Solution: 150 mL    Solution: 300 mL  Total IN: 2350 mL    OUT:    Voided: 950 mL  Total OUT: 950 mL    Total NET: 1400 mL            PHYSICAL EXAM:    GENERAL: NAD, well-groomed, well-developed  HEAD:  Atraumatic, Normocephalic  EYES: EOMI, PERRLA, conjunctiva and sclera clear  ENMT: No tonsillar erythema, exudates, or enlargement; Moist mucous membranes, Good dentition, No lesions  NECK: Supple, No JVD, Normal thyroid  NERVOUS SYSTEM:  Alert & Oriented X3, Good concentration; Motor Strength 5/5 B/L upper and lower extremities; DTRs 2+ intact and symmetric  CHEST/LUNG: Clear to percussion bilaterally; No rales, rhonchi, wheezing, or rubs  HEART: Regular rate and rhythm; No murmurs, rubs, or gallops  ABDOMEN: Soft, Nontender, Nondistended; Bowel sounds present  EXTREMITIES:  2+ Peripheral Pulses, No clubbing, cyanosis, or edema  LYMPH: No lymphadenopathy noted  SKIN: No rashes or lesions      LABS:  CBC Full  -  ( 16 Jul 2017 07:00 )  WBC Count : 9.3 K/uL  Hemoglobin : 11.7 g/dL  Hematocrit : 33.3 %  Platelet Count - Automated : 123 K/uL  Mean Cell Volume : 90.7 fl  Mean Cell Hemoglobin : 31.9 pg  Mean Cell Hemoglobin Concentration : 35.2 gm/dL  Auto Neutrophil # : x  Auto Lymphocyte # : x  Auto Monocyte # : x  Auto Eosinophil # : x  Auto Basophil # : x  Auto Neutrophil % : x  Auto Lymphocyte % : x  Auto Monocyte % : x  Auto Eosinophil % : x  Auto Basophil % : x    07-16    141  |  106  |  10  ----------------------------<  82  3.6   |  28  |  0.88    Ca    8.1<L>      16 Jul 2017 07:00  Mg     1.9     07-16    TPro  5.7<L>  /  Alb  2.3<L>  /  TBili  0.7  /  DBili  x   /  AST  20  /  ALT  22  /  AlkPhos  78  07-15    PTT - ( 16 Jul 2017 10:53 )  PTT:84.8 sec        RADIOLOGY & ADDITIONAL STUDIES:
INTERVAL HPI:    83 year male with HLD, Obesity, Dementia and Colon Cancer treated only with  Colectomy few years ago. Was noticed with altered mental status yesterday, was taken to [facility] . Had work up for CVA which was negative so was released . Today again was noticed , not talking and complained about SOB. Wife reported  that was complaining of chest pain from time to time.  Family also noticed some cough for few days. No smoker.  CTA chest positive for PE with suspected Pneumonia.  Elevated procalcitonin.    OVERNIGHT EVENTS:  Awake, comfortable  no distress.    Vital Signs Last 24 Hrs  T(C): 36.2 (2017 16:26), Max: 37.7 (2017 10:40)  T(F): 97.2 (2017 16:26), Max: 99.8 (2017 10:40)  HR: 66 (2017 16:26) (57 - 66)  BP: 112/62 (2017 16:26) (112/62 - 146/71)  BP(mean): --  RR: 17 (2017 16:26) (16 - 18)  SpO2: 96% (2017 16:26) (90% - 96%)    PHYSICAL EXAM:  GEN:        Awake, responsive and comfortable.  HEENT:    Normal.    RESP:      no crackles.  CVS:         Regular rate and rhythm.   ABD:         Soft, non-tender, non-distended;     MEDICATIONS  (STANDING):  sodium chloride 0.9%. 1000 milliLiter(s) (40 mL/Hr) IV Continuous <Continuous>  piperacillin/tazobactam IVPB. 3.375 Gram(s) IV Intermittent every 8 hours  vancomycin  IVPB   IV Intermittent   vancomycin  IVPB 750 milliGRAM(s) IV Intermittent every 12 hours  simvastatin 10 milliGRAM(s) Oral at bedtime  sertraline 100 milliGRAM(s) Oral daily  memantine 10 milliGRAM(s) Oral daily  QUEtiapine 50 milliGRAM(s) Oral at bedtime  heparin  Infusion. 1300 Unit(s)/Hr (13 mL/Hr) IV Continuous <Continuous>    MEDICATIONS  (PRN):  heparin  Injectable 6500 Unit(s) IV Push every 6 hours PRN For aPTT less than 40  heparin  Injectable 3000 Unit(s) IV Push every 6 hours PRN For aPTT between 40 - 57    LABS:                        11.7   9.3   )-----------( 123      ( 2017 07:00 )             33.3     07-16    141  |  106  |  10  ----------------------------<  82  3.6   |  28  |  0.88    Ca    8.1<L>      2017 07:00  Mg     1.9         TPro  5.7<L>  /  Alb  2.3<L>  /  TBili  0.7  /  DBili  x   /  AST  20  /  ALT  22  /  AlkPhos  78  07-15    PTT - ( 2017 10:53 )  PTT:84.8 sec   @ 13:36  pH: 7.40  pCO2: 42  pO2: 86  SaO2: 96    Urinalysis Basic - ( 2017 18:43 )    Color: Yellow / Appearance: Clear / S.015 / pH: x  Gluc: x / Ketone: Negative  / Bili: Negative / Urobili: 4 mg/dL   Blood: x / Protein: 15 mg/dL / Nitrite: Negative   Leuk Esterase: Negative / RBC: 0-2 /HPF / WBC x   Sq Epi: x / Non Sq Epi: x / Bacteria: x    ASSESSMENT AND PLAN:  ·	Altered Mental status.  ·	Pulmonary embolism.  ·	Bibasilar pneumonia with high procalcitonin.  ·	Hyperkalemia.  ·	Dementia.  ·	HLD.  ·	Colon CA history.    Vanco level noted.  Continue antibiotics and anticoagulation.
INTERVAL HPI:  83 year male with HLD, Obesity, Dementia and Colon Cancer treated only with  Colectomy few years ago. Was noticed with altered mental status yesterday, was taken to [facility] . Had work up for CVA which was negative so was released . Today again was noticed , not talking and complained about SOB. Wife reported  that was complaining of chest pain from time to time.  Family also noticed some cough for few days. No smoker.  CTA chest positive for PE.    OVERNIGHT EVENTS:  Awake, comfortable. confused at base line.    Vital Signs Last 24 Hrs  T(C): 37.1 (15 Jul 2017 11:19), Max: 37.1 (15 Jul 2017 11:19)  T(F): 98.8 (15 Jul 2017 11:19), Max: 98.8 (15 Jul 2017 11:)  HR: 69 (15 Jul 2017 11:19) (60 - 72)  BP: 118/60 (15 Jul 2017 11:19) (117/64 - 139/76)  BP(mean): --  RR: 18 (15 Jul 2017 11:19) (15 - 23)  SpO2: 94% (15 Jul 2017 11:19) (93% - 100%)    PHYSICAL EXAM:  GEN:         Awake, responsive and comfortable.  HEENT:    Normal.    RESP:       no wheezing.  CVS:           Regular rate and rhythm.   ABD:         Soft, non-tender, non-distended;     MEDICATIONS  (STANDING):  sodium chloride 0.9%. 1000 milliLiter(s) (75 mL/Hr) IV Continuous <Continuous>  piperacillin/tazobactam IVPB. 3.375 Gram(s) IV Intermittent every 8 hours  vancomycin  IVPB   IV Intermittent   vancomycin  IVPB 750 milliGRAM(s) IV Intermittent every 12 hours  simvastatin 10 milliGRAM(s) Oral at bedtime  sertraline 100 milliGRAM(s) Oral daily  memantine 10 milliGRAM(s) Oral daily  QUEtiapine 50 milliGRAM(s) Oral at bedtime  heparin  Infusion. 1300 Unit(s)/Hr (13 mL/Hr) IV Continuous <Continuous>    MEDICATIONS  (PRN):  QUEtiapine 25 milliGRAM(s) Oral once PRN if patient becomes combative and agitated overnight  heparin  Injectable 6500 Unit(s) IV Push every 6 hours PRN For aPTT less than 40  heparin  Injectable 3000 Unit(s) IV Push every 6 hours PRN For aPTT between 40 - 57    LABS:                        11.1   9.4   )-----------( 110      ( 15 Jul 2017 07:43 )             31.9     15    143  |  107  |  12  ----------------------------<  81  3.7   |  28  |  0.84    Ca    8.1<L>      15 Jul 2017 07:43  Mg     2.1     07-15    TPro  5.7<L>  /  Alb  2.3<L>  /  TBili  0.7  /  DBili  x   /  AST  20  /  ALT  22  /  AlkPhos  78  0715    PT/INR - ( 2017 10:25 )   PT: 13.5 sec;   INR: 1.23 ratio         PTT - ( 15 Jul 2017 07:43 )  PTT:48.6 sec   @ 13:36  pH: 7.40  pCO2: 42  pO2: 86  SaO2: 96    Urinalysis Basic - ( 2017 11:17 )    Color: Yellow / Appearance: Clear / S.015 / pH: x  Gluc: x / Ketone: Trace  / Bili: Negative / Urobili: 4 mg/dL   Blood: x / Protein: 30 mg/dL / Nitrite: Negative   Leuk Esterase: Trace / RBC: 3-5 /HPF / WBC 3-5   Sq Epi: x / Non Sq Epi: Few / Bacteria: x    RADIOLOGY & ADDITIONAL STUDIES:    INTERVAL HPI:      OVERNIGHT EVENTS:        Vital Signs Last 24 Hrs  T(C): 37.1 (15 Jul 2017 11:19), Max: 37.1 (15 Jul 2017 11:19)  T(F): 98.8 (15 Jul 2017 11:19), Max: 98.8 (15 Jul 2017 11:19)  HR: 69 (15 Jul 2017 11:19) (60 - 72)  BP: 118/60 (15 Jul 2017 11:19) (117/64 - 139/76)  BP(mean): --  RR: 18 (15 Jul 2017 11:19) (15 - 23)  SpO2: 94% (15 Jul 2017 11:19) (93% - 100%)        PHYSICAL EXAM:  GEN:         Awake, responsive and comfortable.  HEENT:    Normal.    RESP:   CVS:             Regular rate and rhythm.   ABD:         Soft, non-tender, non-distended;   :             No costovertebral angle tenderness  EXTR:            No clubbing, cyanosis or edema  CNS:              Intact sensory and motor function.        MEDICATIONS  (STANDING):  sodium chloride 0.9%. 1000 milliLiter(s) (75 mL/Hr) IV Continuous <Continuous>  piperacillin/tazobactam IVPB. 3.375 Gram(s) IV Intermittent every 8 hours  vancomycin  IVPB   IV Intermittent   vancomycin  IVPB 750 milliGRAM(s) IV Intermittent every 12 hours  simvastatin 10 milliGRAM(s) Oral at bedtime  sertraline 100 milliGRAM(s) Oral daily  memantine 10 milliGRAM(s) Oral daily  QUEtiapine 50 milliGRAM(s) Oral at bedtime  heparin  Infusion. 1300 Unit(s)/Hr (13 mL/Hr) IV Continuous <Continuous>    MEDICATIONS  (PRN):  QUEtiapine 25 milliGRAM(s) Oral once PRN if patient becomes combative and agitated overnight  heparin  Injectable 6500 Unit(s) IV Push every 6 hours PRN For aPTT less than 40  heparin  Injectable 3000 Unit(s) IV Push every 6 hours PRN For aPTT between 40 - 57        LABS:                        11.1   9.4   )-----------( 110      ( 15 Jul 2017 07:43 )             31.9     07-15    143  |  107  |  12  ----------------------------<  81  3.7   |  28  |  0.84    Ca    8.1<L>      15 Jul 2017 07:43  Mg     2.1     07-15    TPro  5.7<L>  /  Alb  2.3<L>  /  TBili  0.7  /  DBili  x   /  AST  20  /  ALT  22  /  AlkPhos  78  07-15    PT/INR - ( 2017 10:25 )   PT: 13.5 sec;   INR: 1.23 ratio         PTT - ( 15 Jul 2017 07:43 )  PTT:48.6 sec   @ 13:36  pH: 7.40  pCO2: 42  pO2: 86  SaO2: 96    Urinalysis Basic - ( 2017 11:17 )    Color: Yellow / Appearance: Clear / S.015 / pH: x  Gluc: x / Ketone: Trace  / Bili: Negative / Urobili: 4 mg/dL   Blood: x / Protein: 30 mg/dL / Nitrite: Negative   Leuk Esterase: Trace / RBC: 3-5 /HPF / WBC 3-5   Sq Epi: x / Non Sq Epi: Few / Bacteria: x    ASSESSMENT AND PLAN:  ·	Altered Mental status.  ·	Pulmonary embolism.  ·	Bibasilar pneumonia with high procalcitonin.  ·	Hyperkalemia.  ·	Dementia.  ·	HLD.  ·	Colon CA history.    Continue Antibiotics and IV heparin.  Vanco level in am  Fall precautions.  Reduce IV fluids.
INTERVAL HPI:  83 year male with HLD, Obesity, Dementia and Colon Cancer treated only with  Colectomy few years ago. Was noticed with altered mental status yesterday, was taken to [facility] . Had work up for CVA which was negative so was released . Today again was noticed , not talking and complained about SOB. Wife reported  that was complaining of chest pain from time to time.  Family also noticed some cough for few days. No smoker.  CTA chest positive for PE with suspected Pneumonia.  Elevated procalcitonin.     OVERNIGHT EVENTS:  Off telemetry and IV heparin.  Started on Xarelto. Remains  confused at base line.    Vital Signs Last 24 Hrs  T(C): 36.6 (2017 17:18), Max: 38 (2017 20:15)  T(F): 97.8 (2017 17:18), Max: 100.4 (2017 20:15)  HR: 62 (2017 17:18) (58 - 77)  BP: 150/81 (2017 17:18) (101/47 - 152/73)  BP(mean): --  RR: 17 (2017 17:18) (16 - 19)  SpO2: 95% (2017 17:18) (92% - 98%)    PHYSICAL EXAM:  GEN:        Awake, responsive and comfortable.  HEENT:    Normal.    RESP:      crackles.  CVS:          Regular rate and rhythm.   ABD:         Soft, non-tender, non-distended;     MEDICATIONS  (STANDING):  sodium chloride 0.9%. 1000 milliLiter(s) (40 mL/Hr) IV Continuous <Continuous>  piperacillin/tazobactam IVPB. 3.375 Gram(s) IV Intermittent every 8 hours  vancomycin  IVPB   IV Intermittent   vancomycin  IVPB 750 milliGRAM(s) IV Intermittent every 12 hours  simvastatin 10 milliGRAM(s) Oral at bedtime  sertraline 100 milliGRAM(s) Oral daily  memantine 10 milliGRAM(s) Oral daily  QUEtiapine 50 milliGRAM(s) Oral at bedtime  rivaroxaban 15 milliGRAM(s) Oral two times a day    MEDICATIONS  (PRN):  heparin  Injectable 6500 Unit(s) IV Push every 6 hours PRN For aPTT less than 40  heparin  Injectable 3000 Unit(s) IV Push every 6 hours PRN For aPTT between 40 - 57  acetaminophen   Tablet 650 milliGRAM(s) Oral every 6 hours PRN For Temp greater than 38 C (100.4 F)    LABS:                        12.5   9.7   )-----------( 158      ( 2017 08:45 )             35.5     PTT - ( 2017 08:45 )  PTT:83.3 sec  07-14 @ 13:36  pH: 7.40  pCO2: 42  pO2: 86  SaO2: 96    Urinalysis Basic - ( 2017 18:43 )    Color: Yellow / Appearance: Clear / S.015 / pH: x  Gluc: x / Ketone: Negative  / Bili: Negative / Urobili: 4 mg/dL   Blood: x / Protein: 15 mg/dL / Nitrite: Negative   Leuk Esterase: Negative / RBC: 0-2 /HPF / WBC x   Sq Epi: x / Non Sq Epi: x / Bacteria: x    ASSESSMENT AND PLAN:  ·	Altered Mental status.  ·	Pulmonary embolism.  ·	Bibasilar pneumonia with high procalcitonin.  ·	Hyperkalemia.  ·	Dementia.  ·	HLD.  ·	Colon CA history.
INTERVAL HPI:  83 year male with HLD, Obesity, Dementia and Colon Cancer treated only with  Colectomy few years ago. Was noticed with altered mental status yesterday, was taken to [facility] . Had work up for CVA which was negative so was released . Today again was noticed , not talking and complained about SOB. Wife reported  that was complaining of chest pain from time to time.  Family also noticed some cough for few days. No smoker.  CTA chest positive for PE with suspected Pneumonia.  Elevated procalcitonin.     OVERNIGHT EVENTS:  comfortable in bed, no distress.    Vital Signs Last 24 Hrs  T(C): 36.8 (20 Jul 2017 13:08), Max: 37.7 (19 Jul 2017 23:43)  T(F): 98.2 (20 Jul 2017 13:08), Max: 99.8 (19 Jul 2017 23:43)  HR: 82 (20 Jul 2017 13:08) (62 - 82)  BP: 134/74 (20 Jul 2017 13:08) (126/55 - 159/78)  BP(mean): --  RR: 17 (20 Jul 2017 13:08) (16 - 17)  SpO2: 95% (20 Jul 2017 13:08) (95% - 99%)    PHYSICAL EXAM:  GEN:        Awake, responsive and comfortable.  HEENT:    Normal.    RESP:     no wheezing    CVS:          Regular rate and rhythm.   ABD:         Soft, non-tender, non-distended;     MEDICATIONS  (STANDING):  sodium chloride 0.9%. 1000 milliLiter(s) (40 mL/Hr) IV Continuous <Continuous>  simvastatin 10 milliGRAM(s) Oral at bedtime  sertraline 100 milliGRAM(s) Oral daily  memantine 10 milliGRAM(s) Oral daily  QUEtiapine 50 milliGRAM(s) Oral at bedtime  rivaroxaban 15 milliGRAM(s) Oral two times a day  BACItracin   Ointment 1 Application(s) Topical two times a day    MEDICATIONS  (PRN):  acetaminophen   Tablet 650 milliGRAM(s) Oral every 6 hours PRN For Temp greater than 38 C (100.4 F)    LABS:                        13.4   8.9   )-----------( 206      ( 20 Jul 2017 07:36 )             37.9     07-20    141  |  104  |  10  ----------------------------<  81  4.1   |  30  |  0.78    Ca    9.0      20 Jul 2017 07:36    PT/INR - ( 20 Jul 2017 07:36 )   PT: 17.6 sec;   INR: 1.60 ratio      PTT - ( 20 Jul 2017 07:36 )  PTT:33.2 sec  07-14 @ 13:36  pH: 7.40  pCO2: 42  pO2: 86  SaO2: 96    ASSESSMENT AND PLAN:  ·	Altered Mental status.  ·	Pulmonary embolism.  ·	Bibasilar pneumonia with high procalcitonin.  ·	Hyperkalemia.  ·	Dementia.  ·	HLD.  ·	Colon CA history.    Awaiting placement.. Pulmonary wise stable.
INTERVAL HPI:  83 year male with HLD, Obesity, Dementia and Colon Cancer treated only with  Colectomy few years ago. Was noticed with altered mental status yesterday, was taken to [facility] . Had work up for CVA which was negative so was released . Today again was noticed , not talking and complained about SOB. Wife reported  that was complaining of chest pain from time to time.  Family also noticed some cough for few days. No smoker.  CTA chest positive for PE with suspected Pneumonia.  Elevated procalcitonin.     OVERNIGHT EVENTS:  Awake and comfortable    Vital Signs Last 24 Hrs  T(C): 36.6 (19 Jul 2017 12:47), Max: 36.9 (18 Jul 2017 23:49)  T(F): 97.9 (19 Jul 2017 12:47), Max: 98.4 (18 Jul 2017 23:49)  HR: 59 (19 Jul 2017 12:47) (59 - 69)  BP: 134/66 (19 Jul 2017 12:47) (115/67 - 150/81)  BP(mean): --  RR: 16 (19 Jul 2017 12:47) (16 - 18)  SpO2: 99% (19 Jul 2017 12:47) (94% - 99%)    PHYSICAL EXAM:  GEN:         Awake, responsive and comfortable.  HEENT:    Normal.    RESP:      no wheezing  CVS:          Regular rate and rhythm.   ABD:         Soft, non-tender, non-distended;     MEDICATIONS  (STANDING):  sodium chloride 0.9%. 1000 milliLiter(s) (40 mL/Hr) IV Continuous <Continuous>  piperacillin/tazobactam IVPB. 3.375 Gram(s) IV Intermittent every 8 hours  vancomycin  IVPB   IV Intermittent   vancomycin  IVPB 750 milliGRAM(s) IV Intermittent every 12 hours  simvastatin 10 milliGRAM(s) Oral at bedtime  sertraline 100 milliGRAM(s) Oral daily  memantine 10 milliGRAM(s) Oral daily  QUEtiapine 50 milliGRAM(s) Oral at bedtime  rivaroxaban 15 milliGRAM(s) Oral two times a day  BACItracin   Ointment 1 Application(s) Topical two times a day    MEDICATIONS  (PRN):  acetaminophen   Tablet 650 milliGRAM(s) Oral every 6 hours PRN For Temp greater than 38 C (100.4 F)    LABS:                        12.5   7.9   )-----------( 182      ( 19 Jul 2017 06:26 )             35.0     07-19    141  |  105  |  8   ----------------------------<  83  3.9   |  28  |  0.84    Ca    8.6      19 Jul 2017 06:26      PT/INR - ( 19 Jul 2017 06:26 )   PT: 14.5 sec;   INR: 1.32 ratio       PTT - ( 19 Jul 2017 06:26 )  PTT:30.7 sec  07-14 @ 13:36  pH: 7.40  pCO2: 42  pO2: 86  SaO2: 96    ASSESSMENT AND PLAN:  ·	Altered Mental status.  ·	Pulmonary embolism.  ·	Bibasilar pneumonia with high procalcitonin.  ·	Hyperkalemia.  ·	Dementia.  ·	HLD.  ·	Colon CA history.    Pulmonary wise improved and stable.  For discharge on Xarelto
INTERVAL HPI:  83 year male with HLD, Obesity, Dementia and Colon Cancer treated only with  Colectomy few years ago. Was noticed with altered mental status yesterday, was taken to [facility] . Had work up for CVA which was negative so was released . Today again was noticed , not talking and complained about SOB. Wife reported  that was complaining of chest pain from time to time.  Family also noticed some cough for few days. No smoker.  CTA chest positive for PE with suspected Pneumonia.  Elevated procalcitonin.     OVERNIGHT EVENTS:  Off telemetry and IV heparin.  Started on Xarelto. Remains  confused at base line.    Vital Signs Last 24 Hrs  T(C): 36.6 (2017 17:18), Max: 38 (2017 20:15)  T(F): 97.8 (2017 17:18), Max: 100.4 (2017 20:15)  HR: 62 (2017 17:18) (58 - 77)  BP: 150/81 (2017 17:18) (101/47 - 152/73)  BP(mean): --  RR: 17 (2017 17:18) (16 - 19)  SpO2: 95% (2017 17:18) (92% - 98%)    PHYSICAL EXAM:  GEN:        Awake, responsive and comfortable.  HEENT:    Normal.    RESP:      crackles.  CVS:          Regular rate and rhythm.   ABD:         Soft, non-tender, non-distended;     MEDICATIONS  (STANDING):  sodium chloride 0.9%. 1000 milliLiter(s) (40 mL/Hr) IV Continuous <Continuous>  piperacillin/tazobactam IVPB. 3.375 Gram(s) IV Intermittent every 8 hours  vancomycin  IVPB   IV Intermittent   vancomycin  IVPB 750 milliGRAM(s) IV Intermittent every 12 hours  simvastatin 10 milliGRAM(s) Oral at bedtime  sertraline 100 milliGRAM(s) Oral daily  memantine 10 milliGRAM(s) Oral daily  QUEtiapine 50 milliGRAM(s) Oral at bedtime  rivaroxaban 15 milliGRAM(s) Oral two times a day    MEDICATIONS  (PRN):  heparin  Injectable 6500 Unit(s) IV Push every 6 hours PRN For aPTT less than 40  heparin  Injectable 3000 Unit(s) IV Push every 6 hours PRN For aPTT between 40 - 57  acetaminophen   Tablet 650 milliGRAM(s) Oral every 6 hours PRN For Temp greater than 38 C (100.4 F)    LABS:                        12.5   9.7   )-----------( 158      ( 2017 08:45 )             35.5     PTT - ( 2017 08:45 )  PTT:83.3 sec  07-14 @ 13:36  pH: 7.40  pCO2: 42  pO2: 86  SaO2: 96    Urinalysis Basic - ( 2017 18:43 )    Color: Yellow / Appearance: Clear / S.015 / pH: x  Gluc: x / Ketone: Negative  / Bili: Negative / Urobili: 4 mg/dL   Blood: x / Protein: 15 mg/dL / Nitrite: Negative   Leuk Esterase: Negative / RBC: 0-2 /HPF / WBC x   Sq Epi: x / Non Sq Epi: x / Bacteria: x    ASSESSMENT AND PLAN:  ·	Altered Mental status.  ·	Pulmonary embolism.  ·	Bibasilar pneumonia with high procalcitonin.  ·	Hyperkalemia.  ·	Dementia.  ·	HLD.  ·	Colon CA history.    Continue Xarelto and antibiotics.  Fall precautions.
INTERVAL HPI: 83 year male with HLD, Obesity, Dementia and Colon Cancer treated only with  Colectomy few years ago. Was noticed with altered mental status yesterday, was taken to [facility] . Had work up for CVA which was negative so was released . Today again was noticed , not talking and complained about SOB. Wife reported  that was complaining of chest pain from time to time.  Family also noticed some cough for few days. No smoker.  CTA chest positive for PE with suspected Pneumonia.  Elevated procalcitonin.      OVERNIGHT EVENTS:  Resting comfortably without distress    Vital Signs Last 24 Hrs  T(C): 36.6 (2017 11:03), Max: 36.7 (2017 23:45)  T(F): 97.8 (2017 11:03), Max: 98 (2017 23:45)  HR: 68 (2017 11:03) (60 - 68)  BP: 106/60 (2017 11:03) (106/60 - 133/67)  BP(mean): --  RR: 18 (2017 11:03) (18 - 18)  SpO2: 96% (2017 11:03) (95% - 96%)    PHYSICAL EXAM:  GEN:         Awake, responsive and comfortable.  HEENT:    Normal.    RESP:       crackles.  CVS:          Regular rate and rhythm.   ABD:         Soft, non-tender, non-distended;     MEDICATIONS  (STANDING):  sodium chloride 0.9%. 1000 milliLiter(s) (40 mL/Hr) IV Continuous <Continuous>  piperacillin/tazobactam IVPB. 3.375 Gram(s) IV Intermittent every 8 hours  vancomycin  IVPB   IV Intermittent   vancomycin  IVPB 750 milliGRAM(s) IV Intermittent every 12 hours  simvastatin 10 milliGRAM(s) Oral at bedtime  sertraline 100 milliGRAM(s) Oral daily  memantine 10 milliGRAM(s) Oral daily  QUEtiapine 50 milliGRAM(s) Oral at bedtime  heparin  Infusion. 1300 Unit(s)/Hr (13 mL/Hr) IV Continuous <Continuous>    MEDICATIONS  (PRN):  heparin  Injectable 6500 Unit(s) IV Push every 6 hours PRN For aPTT less than 40  heparin  Injectable 3000 Unit(s) IV Push every 6 hours PRN For aPTT between 40 - 57    LABS:                        11.7   10.7  )-----------( 148      ( 2017 07:30 )             33.3     07-16    141  |  106  |  10  ----------------------------<  82  3.6   |  28  |  0.88    Ca    8.1<L>      2017 07:00  Mg     1.9     -    PTT - ( 2017 07:30 )  PTT:74.4 sec   @ 13:36  pH: 7.40  pCO2: 42  pO2: 86  SaO2: 96    Urinalysis Basic - ( 2017 18:43 )    Color: Yellow / Appearance: Clear / S.015 / pH: x  Gluc: x / Ketone: Negative  / Bili: Negative / Urobili: 4 mg/dL   Blood: x / Protein: 15 mg/dL / Nitrite: Negative   Leuk Esterase: Negative / RBC: 0-2 /HPF / WBC x   Sq Epi: x / Non Sq Epi: x / Bacteria: x    ASSESSMENT AND PLAN:  ·	Altered Mental status.  ·	Pulmonary embolism.  ·	Bibasilar pneumonia with high procalcitonin.  ·	Hyperkalemia.  ·	Dementia.  ·	HLD.  ·	Colon CA history.    Continue treatment.

## 2017-07-24 DIAGNOSIS — G92 TOXIC ENCEPHALOPATHY: ICD-10-CM

## 2017-07-24 DIAGNOSIS — F05 DELIRIUM DUE TO KNOWN PHYSIOLOGICAL CONDITION: ICD-10-CM

## 2017-07-24 DIAGNOSIS — G30.1 ALZHEIMER'S DISEASE WITH LATE ONSET: ICD-10-CM

## 2017-07-24 DIAGNOSIS — J69.0 PNEUMONITIS DUE TO INHALATION OF FOOD AND VOMIT: ICD-10-CM

## 2017-07-24 DIAGNOSIS — I26.99 OTHER PULMONARY EMBOLISM WITHOUT ACUTE COR PULMONALE: ICD-10-CM

## 2017-07-24 DIAGNOSIS — E78.00 PURE HYPERCHOLESTEROLEMIA, UNSPECIFIED: ICD-10-CM

## 2017-07-24 DIAGNOSIS — R74.8 ABNORMAL LEVELS OF OTHER SERUM ENZYMES: ICD-10-CM

## 2017-07-24 DIAGNOSIS — J15.6 PNEUMONIA DUE TO OTHER GRAM-NEGATIVE BACTERIA: ICD-10-CM

## 2017-07-24 DIAGNOSIS — Z90.49 ACQUIRED ABSENCE OF OTHER SPECIFIED PARTS OF DIGESTIVE TRACT: ICD-10-CM

## 2017-07-24 DIAGNOSIS — F10.10 ALCOHOL ABUSE, UNCOMPLICATED: ICD-10-CM

## 2017-07-24 DIAGNOSIS — Z85.038 PERSONAL HISTORY OF OTHER MALIGNANT NEOPLASM OF LARGE INTESTINE: ICD-10-CM

## 2017-07-24 DIAGNOSIS — E78.5 HYPERLIPIDEMIA, UNSPECIFIED: ICD-10-CM

## 2017-07-24 DIAGNOSIS — R32 UNSPECIFIED URINARY INCONTINENCE: ICD-10-CM

## 2017-07-24 DIAGNOSIS — Z51.5 ENCOUNTER FOR PALLIATIVE CARE: ICD-10-CM

## 2017-07-24 DIAGNOSIS — J18.9 PNEUMONIA, UNSPECIFIED ORGANISM: ICD-10-CM

## 2017-07-24 DIAGNOSIS — R07.9 CHEST PAIN, UNSPECIFIED: ICD-10-CM

## 2017-07-24 DIAGNOSIS — R55 SYNCOPE AND COLLAPSE: ICD-10-CM

## 2017-07-24 DIAGNOSIS — F02.81 DEMENTIA IN OTHER DISEASES CLASSIFIED ELSEWHERE, UNSPECIFIED SEVERITY, WITH BEHAVIORAL DISTURBANCE: ICD-10-CM

## 2017-07-24 DIAGNOSIS — E66.9 OBESITY, UNSPECIFIED: ICD-10-CM

## 2017-10-09 NOTE — ED CLERICAL - DIVISION
[facility]...
I will SWITCH the dose or number of times a day I take the medications listed below when I get home from the hospital:  None

## 2017-10-18 NOTE — ED ADULT NURSE NOTE - CAS ELECT INFOMATION PROVIDED
DC instructions
- Pt given bolus and started on insulin gtt.   - Monitor FS q1.   - monitor BMP q4  - NPO for now.   - Monitor anion gap q 4  - Check A1C in AM

## 2020-01-10 NOTE — PATIENT PROFILE ADULT. - NSALCOHOLUSE_GEN_A_CORE_FT
drank wine when he became demented but stopped 1 year ago according to the family
Continue Regimen: Isotretinoin 40 mg PO QD
Detail Level: Zone

## 2020-06-09 NOTE — ED PROVIDER NOTE - FAMILY HISTORY
Bed: ED03  Expected date: 6/9/20  Expected time: 3:20 AM  Means of arrival: Ambulance  Comments:  HEMS 449 64M abd pain   No pertinent family history in first degree relatives

## 2024-12-09 NOTE — ED PROVIDER NOTE - SECONDARY DIAGNOSIS.
No further action needed.     Will close encounter.     Renetta RN, BSN  [facility]     Pulmonary embolism